# Patient Record
Sex: FEMALE | Race: WHITE | NOT HISPANIC OR LATINO | Employment: PART TIME | URBAN - METROPOLITAN AREA
[De-identification: names, ages, dates, MRNs, and addresses within clinical notes are randomized per-mention and may not be internally consistent; named-entity substitution may affect disease eponyms.]

---

## 2018-03-22 ENCOUNTER — OFFICE VISIT CONVERTED (OUTPATIENT)
Dept: FAMILY MEDICINE CLINIC | Facility: CLINIC | Age: 38
End: 2018-03-22
Attending: PHYSICIAN ASSISTANT

## 2018-03-22 ENCOUNTER — CONVERSION ENCOUNTER (OUTPATIENT)
Dept: FAMILY MEDICINE CLINIC | Facility: CLINIC | Age: 38
End: 2018-03-22

## 2018-06-22 ENCOUNTER — OFFICE VISIT CONVERTED (OUTPATIENT)
Dept: FAMILY MEDICINE CLINIC | Facility: CLINIC | Age: 38
End: 2018-06-22
Attending: PHYSICIAN ASSISTANT

## 2018-06-22 ENCOUNTER — CONVERSION ENCOUNTER (OUTPATIENT)
Dept: FAMILY MEDICINE CLINIC | Facility: CLINIC | Age: 38
End: 2018-06-22

## 2018-11-08 ENCOUNTER — OFFICE VISIT CONVERTED (OUTPATIENT)
Dept: FAMILY MEDICINE CLINIC | Facility: CLINIC | Age: 38
End: 2018-11-08
Attending: PHYSICIAN ASSISTANT

## 2018-12-04 ENCOUNTER — OFFICE VISIT CONVERTED (OUTPATIENT)
Dept: FAMILY MEDICINE CLINIC | Facility: CLINIC | Age: 38
End: 2018-12-04
Attending: PHYSICIAN ASSISTANT

## 2019-02-21 ENCOUNTER — OFFICE VISIT CONVERTED (OUTPATIENT)
Dept: FAMILY MEDICINE CLINIC | Facility: CLINIC | Age: 39
End: 2019-02-21
Attending: PHYSICIAN ASSISTANT

## 2019-02-22 ENCOUNTER — HOSPITAL ENCOUNTER (OUTPATIENT)
Dept: LAB | Facility: HOSPITAL | Age: 39
Discharge: HOME | End: 2019-02-22
Attending: PHYSICIAN ASSISTANT

## 2019-02-22 LAB
T4 FREE SERPL-MCNC: 1 NG/DL (ref 0.9–1.8)
TSH SERPL-ACNC: 1.44 M[IU]/L (ref 0.27–4.2)

## 2019-03-26 ENCOUNTER — HOSPITAL ENCOUNTER (OUTPATIENT)
Dept: LAB | Facility: HOSPITAL | Age: 39
Discharge: HOME | End: 2019-03-26

## 2019-03-26 LAB
ALBUMIN SERPL-MCNC: 4.7 G/DL (ref 3.5–5)
ALBUMIN/GLOB SERPL: 1.6 {RATIO} (ref 1.4–2.6)
ALP SERPL-CCNC: 68 U/L (ref 42–98)
ALT SERPL-CCNC: 31 U/L (ref 10–40)
ANION GAP SERPL CALC-SCNC: 17 MMOL/L (ref 8–19)
AST SERPL-CCNC: 24 U/L (ref 15–50)
BASOPHILS # BLD AUTO: 0.04 10*3/UL (ref 0–0.2)
BASOPHILS NFR BLD AUTO: 0.6 % (ref 0–3)
BILIRUB SERPL-MCNC: 0.32 MG/DL (ref 0.2–1.3)
BUN SERPL-MCNC: 14 MG/DL (ref 5–25)
BUN/CREAT SERPL: 19 {RATIO} (ref 6–20)
CALCIUM SERPL-MCNC: 9.3 MG/DL (ref 8.7–10.4)
CHLORIDE SERPL-SCNC: 102 MMOL/L (ref 99–111)
CHOLEST SERPL-MCNC: 181 MG/DL (ref 107–200)
CHOLEST/HDLC SERPL: 3.2 {RATIO} (ref 3–6)
CONV ABS IMM GRAN: 0.02 10*3/UL (ref 0–0.2)
CONV CO2: 22 MMOL/L (ref 22–32)
CONV IMMATURE GRAN: 0.3 % (ref 0–1.8)
CONV TOTAL PROTEIN: 7.7 G/DL (ref 6.3–8.2)
CREAT UR-MCNC: 0.73 MG/DL (ref 0.5–0.9)
DEPRECATED RDW RBC AUTO: 49 FL (ref 36.4–46.3)
EOSINOPHIL # BLD AUTO: 0.24 10*3/UL (ref 0–0.7)
EOSINOPHIL # BLD AUTO: 3.3 % (ref 0–7)
ERYTHROCYTE [DISTWIDTH] IN BLOOD BY AUTOMATED COUNT: 14.1 % (ref 11.7–14.4)
GFR SERPLBLD BASED ON 1.73 SQ M-ARVRAT: >60 ML/MIN/{1.73_M2}
GLOBULIN UR ELPH-MCNC: 3 G/DL (ref 2–3.5)
GLUCOSE SERPL-MCNC: 89 MG/DL (ref 65–99)
HBA1C MFR BLD: 15.1 G/DL (ref 12–16)
HCT VFR BLD AUTO: 45.8 % (ref 37–47)
HDLC SERPL-MCNC: 56 MG/DL (ref 40–60)
LDLC SERPL CALC-MCNC: 114 MG/DL (ref 70–100)
LYMPHOCYTES # BLD AUTO: 1.9 10*3/UL (ref 1–5)
MCH RBC QN AUTO: 31 PG (ref 27–31)
MCHC RBC AUTO-ENTMCNC: 33 G/DL (ref 33–37)
MCV RBC AUTO: 94 FL (ref 81–99)
MONOCYTES # BLD AUTO: 0.56 10*3/UL (ref 0.2–1.2)
MONOCYTES NFR BLD AUTO: 7.7 % (ref 3–10)
NEUTROPHILS # BLD AUTO: 4.47 10*3/UL (ref 2–8)
NEUTROPHILS NFR BLD AUTO: 61.8 % (ref 30–85)
NRBC CBCN: 0 % (ref 0–0.7)
OSMOLALITY SERPL CALC.SUM OF ELEC: 284 MOSM/KG (ref 273–304)
PLATELET # BLD AUTO: 323 10*3/UL (ref 130–400)
PMV BLD AUTO: 9.8 FL (ref 9.4–12.3)
POTASSIUM SERPL-SCNC: 4.4 MMOL/L (ref 3.5–5.3)
RBC # BLD AUTO: 4.87 10*6/UL (ref 4.2–5.4)
SODIUM SERPL-SCNC: 137 MMOL/L (ref 135–147)
TRIGL SERPL-MCNC: 55 MG/DL (ref 40–150)
TSH SERPL-ACNC: 2.34 M[IU]/L (ref 0.27–4.2)
VARIANT LYMPHS NFR BLD MANUAL: 26.3 % (ref 20–45)
VLDLC SERPL-MCNC: 11 MG/DL (ref 5–37)
WBC # BLD AUTO: 7.23 10*3/UL (ref 4.8–10.8)

## 2019-05-21 ENCOUNTER — OFFICE VISIT CONVERTED (OUTPATIENT)
Dept: FAMILY MEDICINE CLINIC | Facility: CLINIC | Age: 39
End: 2019-05-21
Attending: PHYSICIAN ASSISTANT

## 2019-05-21 ENCOUNTER — CONVERSION ENCOUNTER (OUTPATIENT)
Dept: FAMILY MEDICINE CLINIC | Facility: CLINIC | Age: 39
End: 2019-05-21

## 2019-08-15 ENCOUNTER — HOSPITAL ENCOUNTER (OUTPATIENT)
Dept: LAB | Facility: HOSPITAL | Age: 39
Discharge: HOME | End: 2019-08-15
Attending: PHYSICIAN ASSISTANT

## 2019-08-15 LAB
T4 FREE SERPL-MCNC: 1 NG/DL (ref 0.9–1.8)
TSH SERPL-ACNC: 0.97 M[IU]/L (ref 0.27–4.2)

## 2019-08-23 ENCOUNTER — OFFICE VISIT CONVERTED (OUTPATIENT)
Dept: FAMILY MEDICINE CLINIC | Facility: CLINIC | Age: 39
End: 2019-08-23
Attending: PHYSICIAN ASSISTANT

## 2019-08-23 ENCOUNTER — CONVERSION ENCOUNTER (OUTPATIENT)
Dept: FAMILY MEDICINE CLINIC | Facility: CLINIC | Age: 39
End: 2019-08-23

## 2019-12-05 ENCOUNTER — CONVERSION ENCOUNTER (OUTPATIENT)
Dept: FAMILY MEDICINE CLINIC | Facility: CLINIC | Age: 39
End: 2019-12-05

## 2019-12-05 ENCOUNTER — OFFICE VISIT CONVERTED (OUTPATIENT)
Dept: FAMILY MEDICINE CLINIC | Facility: CLINIC | Age: 39
End: 2019-12-05
Attending: PHYSICIAN ASSISTANT

## 2020-02-17 ENCOUNTER — HOSPITAL ENCOUNTER (OUTPATIENT)
Dept: OTHER | Facility: HOSPITAL | Age: 40
Discharge: HOME | End: 2020-02-17
Attending: INTERNAL MEDICINE

## 2020-03-03 ENCOUNTER — HOSPITAL ENCOUNTER (OUTPATIENT)
Dept: LAB | Facility: HOSPITAL | Age: 40
Discharge: HOME | End: 2020-03-03

## 2020-03-03 LAB
ALBUMIN SERPL-MCNC: 4.1 G/DL (ref 3.5–5)
ALBUMIN/GLOB SERPL: 1.7 {RATIO} (ref 1.4–2.6)
ALP SERPL-CCNC: 59 U/L (ref 42–98)
ALT SERPL-CCNC: 18 U/L (ref 10–40)
ANION GAP SERPL CALC-SCNC: 16 MMOL/L (ref 8–19)
AST SERPL-CCNC: 18 U/L (ref 15–50)
BASOPHILS # BLD AUTO: 0.04 10*3/UL (ref 0–0.2)
BASOPHILS NFR BLD AUTO: 0.6 % (ref 0–3)
BILIRUB SERPL-MCNC: 0.23 MG/DL (ref 0.2–1.3)
BUN SERPL-MCNC: 10 MG/DL (ref 5–25)
BUN/CREAT SERPL: 16 {RATIO} (ref 6–20)
CALCIUM SERPL-MCNC: 8.5 MG/DL (ref 8.7–10.4)
CHLORIDE SERPL-SCNC: 101 MMOL/L (ref 99–111)
CHOLEST SERPL-MCNC: 139 MG/DL (ref 107–200)
CHOLEST/HDLC SERPL: 2.2 {RATIO} (ref 3–6)
CONV ABS IMM GRAN: 0.01 10*3/UL (ref 0–0.2)
CONV CO2: 24 MMOL/L (ref 22–32)
CONV IMMATURE GRAN: 0.1 % (ref 0–1.8)
CONV TOTAL PROTEIN: 6.5 G/DL (ref 6.3–8.2)
CREAT UR-MCNC: 0.64 MG/DL (ref 0.5–0.9)
DEPRECATED RDW RBC AUTO: 47.9 FL (ref 36.4–46.3)
EOSINOPHIL # BLD AUTO: 0.2 10*3/UL (ref 0–0.7)
EOSINOPHIL # BLD AUTO: 2.9 % (ref 0–7)
ERYTHROCYTE [DISTWIDTH] IN BLOOD BY AUTOMATED COUNT: 13.4 % (ref 11.7–14.4)
GFR SERPLBLD BASED ON 1.73 SQ M-ARVRAT: >60 ML/MIN/{1.73_M2}
GLOBULIN UR ELPH-MCNC: 2.4 G/DL (ref 2–3.5)
GLUCOSE SERPL-MCNC: 91 MG/DL (ref 65–99)
HCT VFR BLD AUTO: 39.4 % (ref 37–47)
HDLC SERPL-MCNC: 62 MG/DL (ref 40–60)
HGB BLD-MCNC: 12.8 G/DL (ref 12–16)
LDLC SERPL CALC-MCNC: 69 MG/DL (ref 70–100)
LYMPHOCYTES # BLD AUTO: 2.27 10*3/UL (ref 1–5)
LYMPHOCYTES NFR BLD AUTO: 32.7 % (ref 20–45)
MCH RBC QN AUTO: 31.4 PG (ref 27–31)
MCHC RBC AUTO-ENTMCNC: 32.5 G/DL (ref 33–37)
MCV RBC AUTO: 96.8 FL (ref 81–99)
MONOCYTES # BLD AUTO: 0.67 10*3/UL (ref 0.2–1.2)
MONOCYTES NFR BLD AUTO: 9.6 % (ref 3–10)
NEUTROPHILS # BLD AUTO: 3.76 10*3/UL (ref 2–8)
NEUTROPHILS NFR BLD AUTO: 54.1 % (ref 30–85)
NRBC CBCN: 0 % (ref 0–0.7)
OSMOLALITY SERPL CALC.SUM OF ELEC: 283 MOSM/KG (ref 273–304)
PLATELET # BLD AUTO: 281 10*3/UL (ref 130–400)
PMV BLD AUTO: 9.4 FL (ref 9.4–12.3)
POTASSIUM SERPL-SCNC: 3.8 MMOL/L (ref 3.5–5.3)
RBC # BLD AUTO: 4.07 10*6/UL (ref 4.2–5.4)
SODIUM SERPL-SCNC: 137 MMOL/L (ref 135–147)
TRIGL SERPL-MCNC: 40 MG/DL (ref 40–150)
TSH SERPL-ACNC: 2.08 M[IU]/L (ref 0.27–4.2)
VLDLC SERPL-MCNC: 8 MG/DL (ref 5–37)
WBC # BLD AUTO: 6.95 10*3/UL (ref 4.8–10.8)

## 2020-03-05 ENCOUNTER — OFFICE VISIT CONVERTED (OUTPATIENT)
Dept: FAMILY MEDICINE CLINIC | Facility: CLINIC | Age: 40
End: 2020-03-05
Attending: PHYSICIAN ASSISTANT

## 2020-03-05 ENCOUNTER — HOSPITAL ENCOUNTER (OUTPATIENT)
Dept: LAB | Facility: HOSPITAL | Age: 40
Discharge: HOME | End: 2020-03-05
Attending: PHYSICIAN ASSISTANT

## 2020-03-08 LAB
CONV ESTROGENS, TOTAL, SERUM: 390 PG/ML
FSH SERPL-ACNC: 5.9 M[IU]/ML
LH SERPL-ACNC: 12.9 M[IU]/ML
PROGEST SERPL-MCNC: 0.7 NG/ML

## 2020-03-09 LAB
CONV TESTOSTERONE, FREE: 2.6 PG/ML
TESTOST FREE MFR SERPL: 0.8 %
TESTOST SERPL-MCNC: 32 NG/DL

## 2020-03-13 ENCOUNTER — HOSPITAL ENCOUNTER (OUTPATIENT)
Dept: GENERAL RADIOLOGY | Facility: HOSPITAL | Age: 40
Discharge: HOME | End: 2020-03-13
Attending: PHYSICIAN ASSISTANT

## 2020-03-13 ENCOUNTER — HOSPITAL ENCOUNTER (OUTPATIENT)
Dept: CARDIOLOGY | Facility: HOSPITAL | Age: 40
Discharge: HOME | End: 2020-03-13
Attending: PHYSICIAN ASSISTANT

## 2020-03-23 ENCOUNTER — HOSPITAL ENCOUNTER (OUTPATIENT)
Dept: LAB | Facility: HOSPITAL | Age: 40
Discharge: HOME | End: 2020-03-23
Attending: PHYSICIAN ASSISTANT

## 2020-03-23 ENCOUNTER — HOSPITAL ENCOUNTER (OUTPATIENT)
Dept: GENERAL RADIOLOGY | Facility: HOSPITAL | Age: 40
Discharge: HOME | End: 2020-03-23
Attending: PHYSICIAN ASSISTANT

## 2020-03-23 LAB
MAGNESIUM SERPL-MCNC: 1.88 MG/DL (ref 1.6–2.3)
PHOSPHATE SERPL-MCNC: 3.3 MG/DL (ref 2.4–4.5)
PTH-INTACT SERPL-MCNC: 33.2 PG/ML (ref 11.1–79.5)

## 2020-03-24 ENCOUNTER — HOSPITAL ENCOUNTER (OUTPATIENT)
Dept: CARDIOLOGY | Facility: HOSPITAL | Age: 40
Discharge: HOME | End: 2020-03-24
Attending: PHYSICIAN ASSISTANT

## 2020-03-24 LAB — CONV CALCIUM IONIZED: 5 MG/DL (ref 4.5–5.6)

## 2020-06-05 ENCOUNTER — OFFICE VISIT CONVERTED (OUTPATIENT)
Dept: FAMILY MEDICINE CLINIC | Facility: CLINIC | Age: 40
End: 2020-06-05
Attending: PHYSICIAN ASSISTANT

## 2020-07-15 ENCOUNTER — OFFICE VISIT CONVERTED (OUTPATIENT)
Dept: CARDIOLOGY | Facility: CLINIC | Age: 40
End: 2020-07-15
Attending: INTERNAL MEDICINE

## 2020-07-15 ENCOUNTER — CONVERSION ENCOUNTER (OUTPATIENT)
Dept: CARDIOLOGY | Facility: CLINIC | Age: 40
End: 2020-07-15

## 2020-07-22 ENCOUNTER — HOSPITAL ENCOUNTER (OUTPATIENT)
Dept: GENERAL RADIOLOGY | Facility: HOSPITAL | Age: 40
Discharge: HOME | End: 2020-07-22
Attending: PHYSICIAN ASSISTANT

## 2020-07-23 ENCOUNTER — HOSPITAL ENCOUNTER (OUTPATIENT)
Dept: URGENT CARE | Facility: CLINIC | Age: 40
Discharge: HOME | End: 2020-07-23
Attending: NURSE PRACTITIONER

## 2020-07-24 ENCOUNTER — HOSPITAL ENCOUNTER (OUTPATIENT)
Dept: URGENT CARE | Facility: CLINIC | Age: 40
Discharge: HOME | End: 2020-07-24
Attending: NURSE PRACTITIONER

## 2020-07-24 LAB — SARS-COV-2 RNA SPEC QL NAA+PROBE: NOT DETECTED

## 2020-07-25 LAB — SARS-COV-2 RNA SPEC QL NAA+PROBE: NOT DETECTED

## 2020-08-19 ENCOUNTER — HOSPITAL ENCOUNTER (OUTPATIENT)
Dept: GENERAL RADIOLOGY | Facility: HOSPITAL | Age: 40
Discharge: HOME | End: 2020-08-19
Attending: PHYSICIAN ASSISTANT

## 2020-09-08 ENCOUNTER — OFFICE VISIT CONVERTED (OUTPATIENT)
Dept: FAMILY MEDICINE CLINIC | Facility: CLINIC | Age: 40
End: 2020-09-08
Attending: PHYSICIAN ASSISTANT

## 2020-09-08 ENCOUNTER — CONVERSION ENCOUNTER (OUTPATIENT)
Dept: FAMILY MEDICINE CLINIC | Facility: CLINIC | Age: 40
End: 2020-09-08

## 2020-12-11 ENCOUNTER — HOSPITAL ENCOUNTER (OUTPATIENT)
Dept: LAB | Facility: HOSPITAL | Age: 40
Discharge: HOME | End: 2020-12-11
Attending: PHYSICIAN ASSISTANT

## 2020-12-11 ENCOUNTER — OFFICE VISIT CONVERTED (OUTPATIENT)
Dept: FAMILY MEDICINE CLINIC | Facility: CLINIC | Age: 40
End: 2020-12-11
Attending: PHYSICIAN ASSISTANT

## 2020-12-11 LAB
25(OH)D3 SERPL-MCNC: 27 NG/ML (ref 30–100)
ALBUMIN SERPL-MCNC: 4.2 G/DL (ref 3.5–5)
ALBUMIN/GLOB SERPL: 1.6 {RATIO} (ref 1.4–2.6)
ALP SERPL-CCNC: 70 U/L (ref 42–98)
ALT SERPL-CCNC: 38 U/L (ref 10–40)
ANION GAP SERPL CALC-SCNC: 17 MMOL/L (ref 8–19)
APPEARANCE UR: CLEAR
AST SERPL-CCNC: 29 U/L (ref 15–50)
BASOPHILS # BLD AUTO: 0.04 10*3/UL (ref 0–0.2)
BASOPHILS NFR BLD AUTO: 0.5 % (ref 0–3)
BILIRUB SERPL-MCNC: 0.2 MG/DL (ref 0.2–1.3)
BILIRUB UR QL: NEGATIVE
BUN SERPL-MCNC: 19 MG/DL (ref 5–25)
BUN/CREAT SERPL: 27 {RATIO} (ref 6–20)
CALCIUM SERPL-MCNC: 8.9 MG/DL (ref 8.7–10.4)
CHLORIDE SERPL-SCNC: 104 MMOL/L (ref 99–111)
CHOLEST SERPL-MCNC: 190 MG/DL (ref 107–200)
CHOLEST/HDLC SERPL: 3 {RATIO} (ref 3–6)
COLOR UR: YELLOW
CONV ABS IMM GRAN: 0.01 10*3/UL (ref 0–0.2)
CONV CO2: 20 MMOL/L (ref 22–32)
CONV COLLECTION SOURCE (UA): NORMAL
CONV IMMATURE GRAN: 0.1 % (ref 0–1.8)
CONV TOTAL PROTEIN: 6.9 G/DL (ref 6.3–8.2)
CONV UROBILINOGEN IN URINE BY AUTOMATED TEST STRIP: 0.2 {EHRLICHU}/DL (ref 0.1–1)
CREAT UR-MCNC: 0.7 MG/DL (ref 0.5–0.9)
DEPRECATED RDW RBC AUTO: 44.8 FL (ref 36.4–46.3)
EOSINOPHIL # BLD AUTO: 0.25 10*3/UL (ref 0–0.7)
EOSINOPHIL # BLD AUTO: 3.4 % (ref 0–7)
ERYTHROCYTE [DISTWIDTH] IN BLOOD BY AUTOMATED COUNT: 13 % (ref 11.7–14.4)
GFR SERPLBLD BASED ON 1.73 SQ M-ARVRAT: >60 ML/MIN/{1.73_M2}
GLOBULIN UR ELPH-MCNC: 2.7 G/DL (ref 2–3.5)
GLUCOSE SERPL-MCNC: 87 MG/DL (ref 65–99)
GLUCOSE UR QL: NEGATIVE MG/DL
HCT VFR BLD AUTO: 41.4 % (ref 37–47)
HDLC SERPL-MCNC: 64 MG/DL (ref 40–60)
HGB BLD-MCNC: 13.6 G/DL (ref 12–16)
HGB UR QL STRIP: NEGATIVE
KETONES UR QL STRIP: NEGATIVE MG/DL
LDLC SERPL CALC-MCNC: 112 MG/DL (ref 70–100)
LEUKOCYTE ESTERASE UR QL STRIP: NEGATIVE
LYMPHOCYTES # BLD AUTO: 1.92 10*3/UL (ref 1–5)
LYMPHOCYTES NFR BLD AUTO: 26.3 % (ref 20–45)
MCH RBC QN AUTO: 30.7 PG (ref 27–31)
MCHC RBC AUTO-ENTMCNC: 32.9 G/DL (ref 33–37)
MCV RBC AUTO: 93.5 FL (ref 81–99)
MONOCYTES # BLD AUTO: 0.73 10*3/UL (ref 0.2–1.2)
MONOCYTES NFR BLD AUTO: 10 % (ref 3–10)
NEUTROPHILS # BLD AUTO: 4.34 10*3/UL (ref 2–8)
NEUTROPHILS NFR BLD AUTO: 59.7 % (ref 30–85)
NITRITE UR QL STRIP: NEGATIVE
NRBC CBCN: 0 % (ref 0–0.7)
OSMOLALITY SERPL CALC.SUM OF ELEC: 286 MOSM/KG (ref 273–304)
PH UR STRIP.AUTO: 5.5 [PH] (ref 5–8)
PLATELET # BLD AUTO: 276 10*3/UL (ref 130–400)
PMV BLD AUTO: 10 FL (ref 9.4–12.3)
POTASSIUM SERPL-SCNC: 3.9 MMOL/L (ref 3.5–5.3)
PROT UR QL: NEGATIVE MG/DL
RBC # BLD AUTO: 4.43 10*6/UL (ref 4.2–5.4)
SODIUM SERPL-SCNC: 137 MMOL/L (ref 135–147)
SP GR UR: 1.02 (ref 1–1.03)
T4 FREE SERPL-MCNC: 0.9 NG/DL (ref 0.9–1.8)
TRIGL SERPL-MCNC: 68 MG/DL (ref 40–150)
TSH SERPL-ACNC: 2.4 M[IU]/L (ref 0.27–4.2)
VLDLC SERPL-MCNC: 14 MG/DL (ref 5–37)
WBC # BLD AUTO: 7.29 10*3/UL (ref 4.8–10.8)

## 2021-01-16 ENCOUNTER — HOSPITAL ENCOUNTER (OUTPATIENT)
Dept: URGENT CARE | Facility: CLINIC | Age: 41
Discharge: HOME | End: 2021-01-16
Attending: NURSE PRACTITIONER

## 2021-01-18 LAB — SARS-COV-2 RNA SPEC QL NAA+PROBE: NOT DETECTED

## 2021-03-12 ENCOUNTER — OFFICE VISIT CONVERTED (OUTPATIENT)
Dept: FAMILY MEDICINE CLINIC | Facility: CLINIC | Age: 41
End: 2021-03-12
Attending: PHYSICIAN ASSISTANT

## 2021-03-12 ENCOUNTER — CONVERSION ENCOUNTER (OUTPATIENT)
Dept: FAMILY MEDICINE CLINIC | Facility: CLINIC | Age: 41
End: 2021-03-12

## 2021-03-30 ENCOUNTER — HOSPITAL ENCOUNTER (OUTPATIENT)
Dept: LAB | Facility: HOSPITAL | Age: 41
Discharge: HOME | End: 2021-03-30

## 2021-03-30 LAB
ALBUMIN SERPL-MCNC: 4.2 G/DL (ref 3.5–5)
ALBUMIN/GLOB SERPL: 1.4 {RATIO} (ref 1.4–2.6)
ALP SERPL-CCNC: 65 U/L (ref 42–98)
ALT SERPL-CCNC: 13 U/L (ref 10–40)
ANION GAP SERPL CALC-SCNC: 14 MMOL/L (ref 8–19)
AST SERPL-CCNC: 16 U/L (ref 15–50)
BASOPHILS # BLD AUTO: 0.03 10*3/UL (ref 0–0.2)
BASOPHILS NFR BLD AUTO: 0.5 % (ref 0–3)
BILIRUB SERPL-MCNC: 0.25 MG/DL (ref 0.2–1.3)
BUN SERPL-MCNC: 9 MG/DL (ref 5–25)
BUN/CREAT SERPL: 12 {RATIO} (ref 6–20)
CALCIUM SERPL-MCNC: 9.1 MG/DL (ref 8.7–10.4)
CHLORIDE SERPL-SCNC: 103 MMOL/L (ref 99–111)
CHOLEST SERPL-MCNC: 172 MG/DL (ref 107–200)
CHOLEST/HDLC SERPL: 3.2 {RATIO} (ref 3–6)
CONV ABS IMM GRAN: 0.01 10*3/UL (ref 0–0.2)
CONV CO2: 21 MMOL/L (ref 22–32)
CONV IMMATURE GRAN: 0.2 % (ref 0–1.8)
CONV TOTAL PROTEIN: 7.2 G/DL (ref 6.3–8.2)
CREAT UR-MCNC: 0.76 MG/DL (ref 0.5–0.9)
DEPRECATED RDW RBC AUTO: 46.8 FL (ref 36.4–46.3)
EOSINOPHIL # BLD AUTO: 0.14 10*3/UL (ref 0–0.7)
EOSINOPHIL # BLD AUTO: 2.2 % (ref 0–7)
ERYTHROCYTE [DISTWIDTH] IN BLOOD BY AUTOMATED COUNT: 13.7 % (ref 11.7–14.4)
GFR SERPLBLD BASED ON 1.73 SQ M-ARVRAT: >60 ML/MIN/{1.73_M2}
GLOBULIN UR ELPH-MCNC: 3 G/DL (ref 2–3.5)
GLUCOSE SERPL-MCNC: 77 MG/DL (ref 65–99)
HCT VFR BLD AUTO: 42.3 % (ref 37–47)
HDLC SERPL-MCNC: 53 MG/DL (ref 40–60)
HGB BLD-MCNC: 14 G/DL (ref 12–16)
LDLC SERPL CALC-MCNC: 103 MG/DL (ref 70–100)
LYMPHOCYTES # BLD AUTO: 1.97 10*3/UL (ref 1–5)
LYMPHOCYTES NFR BLD AUTO: 30.6 % (ref 20–45)
MCH RBC QN AUTO: 30.6 PG (ref 27–31)
MCHC RBC AUTO-ENTMCNC: 33.1 G/DL (ref 33–37)
MCV RBC AUTO: 92.6 FL (ref 81–99)
MONOCYTES # BLD AUTO: 0.53 10*3/UL (ref 0.2–1.2)
MONOCYTES NFR BLD AUTO: 8.2 % (ref 3–10)
NEUTROPHILS # BLD AUTO: 3.75 10*3/UL (ref 2–8)
NEUTROPHILS NFR BLD AUTO: 58.3 % (ref 30–85)
NRBC CBCN: 0 % (ref 0–0.7)
OSMOLALITY SERPL CALC.SUM OF ELEC: 275 MOSM/KG (ref 273–304)
PLATELET # BLD AUTO: 315 10*3/UL (ref 130–400)
PMV BLD AUTO: 9.7 FL (ref 9.4–12.3)
POTASSIUM SERPL-SCNC: 4.3 MMOL/L (ref 3.5–5.3)
RBC # BLD AUTO: 4.57 10*6/UL (ref 4.2–5.4)
SODIUM SERPL-SCNC: 134 MMOL/L (ref 135–147)
TRIGL SERPL-MCNC: 79 MG/DL (ref 40–150)
TSH SERPL-ACNC: 1.62 M[IU]/L (ref 0.27–4.2)
VLDLC SERPL-MCNC: 16 MG/DL (ref 5–37)
WBC # BLD AUTO: 6.43 10*3/UL (ref 4.8–10.8)

## 2021-04-02 ENCOUNTER — HOSPITAL ENCOUNTER (OUTPATIENT)
Dept: VACCINE CLINIC | Facility: HOSPITAL | Age: 41
Discharge: HOME | End: 2021-04-02
Attending: INTERNAL MEDICINE

## 2021-04-27 ENCOUNTER — HOSPITAL ENCOUNTER (OUTPATIENT)
Dept: VACCINE CLINIC | Facility: HOSPITAL | Age: 41
Discharge: HOME | End: 2021-04-27
Attending: INTERNAL MEDICINE

## 2021-05-10 NOTE — H&P
History and Physical      Patient Name: Audra Daniel   Patient ID: [id number]   Sex: Female   YOB: 1980    Primary Care Provider: Solomon Coleman PA-C   Referring Provider: Solomon Coleman PA-C    Visit Date: July 15, 2020    Provider: Ayo Cunningham MD   Location: [facility]   Location Address: [address] [place] [address], [facility]   [place], [place]  [id number]   Location Phone: [phone]          Chief Complaint  · Tachycardia   · Shortness of breath       History Of Present Illness  Consult requested by: Solomon Coleman PA-C   Audra Daniel is a 40 year old /White female with hypothyroidism and anxiety, who had an episode of tachycardia that occurred at work initially about 6 months ago. It was associated with shortness of breath. Rate was in the 120s-130s. The patient has had subsequent episodes since then, all of which appear to have no provoking factors. She was started on Metoprolol with improvement. She has had no syncope, no chest pain, no PND or orthopnea.   PAST MEDICAL HISTORY: Significant for hypothyroidism; PCOS; MTF HR; anxiety.   FAMILY MEDICAL HISTORY: Significant for her mom who had atrial flutter and hypertension.   PSYCHOSOCIAL HISTORY: She is a prior smoker, uses alcohol rarely and caffeine daily but has cut back significantly in the last month and a half or so.   CURRENT MEDICATIONS: include Synthroid 85 mcg daily; Pantoprazole 40 mg daily; Metoprolol 25 mg daily; Metformin 500 mg b.i.d.; Spironolactone 100 mg daily; Fluoxetine 40 mg daily; Bupropion 150 mg b.i.d; Vyvanse 60 mg daily; Topamax 50 mg b.i.d.; Zolpidem 10 mg qhs; Vitamin B; ASA 81 mg daily. The dosage and frequency of the medications were reviewed with the patient.   ALLERGIES: Codeine, Macrobid.       Review of Systems  · Constitutional  o Admits  o : fatigue, good general health lately, recent weight changes   · Eyes  o Denies  o : double vision  · HENT  o Denies  o : hearing loss or ringing,  "chronic sinus problem, swollen glands in neck  · Cardiovascular  o Admits  o : palpitations (fast, fluttering, or skipping beats), shortness of breath while walking or lying flat  o Denies  o : chest pain, swelling (feet, ankles, hands)  · Respiratory  o Denies  o : asthma or wheezing, COPD  · Gastrointestinal  o Denies  o : ulcers, nausea or vomiting  · Neurologic  o Admits  o : headaches  o Denies  o : lightheaded or dizzy, stroke  · Musculoskeletal  o Denies  o : joint pain, back pain  · Endocrine  o Admits  o : thyroid disease  o Denies  o : diabetes, heat or cold intolerance, excessive thirst or urination  · Heme-Lymph  o Denies  o : bleeding or bruising tendency, anemia      Vitals  Date Time BP Position Site L\R Cuff Size HR RR TEMP (F) WT  HT  BMI kg/m2 BSA m2 O2 Sat HC       07/15/2020 01:03 /68 Sitting    78 - R   236lbs 0oz 5'  4\" 40.51 2.2           Physical Examination  · Constitutional  o Appearance  o : Awake, alert, in no acute distress.   · Head and Face  o HEENT  o : PERRLA.  · Eyes  o Conjunctivae  o : Normal.  · Ears, Nose, Mouth and Throat  o Oral Cavity  o :   § Oral Mucosa  § : Normal.  · Neck  o Inspection/Palpation  o : No JVD.  · Respiratory  o Respiratory  o : Chest is symmetrical. Lung fields are clear. No rhonchi or wheezes heard.  · Cardiovascular  o Heart  o :   § Auscultation of Heart  § : S1, S2 are normal. Regular rate and rhythm without murmurs, gallops, or rubs.  o Peripheral Vascular System  o :   § Extremities  § : Nails normal. No clubbing or cyanosis. Femoral pulses adequate. Pedal pulses adequate. No peripheral edema.  · Gastrointestinal  o Abdominal Examination  o : Abdomen soft. No masses. No guarding or rigidity. No hepatosplenomegaly. Bowel sounds normal.  · Musculoskeletal  o General  o :   § General Musculoskeletal  § : Muscle tone and strength were normal.  · Skin and Subcutaneous Tissue  o General Inspection  o : Unremarkable.     Patient's echocardiogram " revealed a normal ejection fraction of 60%.  No significant underlying valvular heart issues.    EKG was performed in the office today.  Indication:       tachycardia.  Results:          normal sinus rhythm and borderline low voltage.    Hemoglobin was 12.8.  TSH is 2.08.      24-hour Holter Monitor showed normal sinus rhythm, average heart rate of 86 beats per minute and sinus tachycardia, occasional rare PACs and PVCs.               Assessment     ASSESSMENT AND PLAN:    Tachycardia - Feel most likely inappropriate sinus tachycardia, possibly triggered by anxiety.  Also, the  differential would be ectopic atrial tachycardia, although not clearly seen on the Holter Monitor.  Did discuss      with patient that either of these two cases are benign in nature.  Also would recommend eliminating all     caffeinated products as well as an exercise program.  The patient appears to have done well symptomatically with Toprol, so will continue this for the time being.  If patient remains stable, may be able to go to an as-needed basis in the future.    MD Sergo Quiroz/[place]ana         This note was transcribed by Jo Love.  montana/sergo   The above service was transcribed by Jo Love, and I attest to the accuracy of the note.  SERGO.                   Electronically Signed by: Jo Love-, -Author on July 20, 2020 09:58:06 AM  Electronically Co-signed by: Ayo Cunningham MD -Reviewer on July 27, 2020 10:59:23 AM

## 2021-05-13 NOTE — PROGRESS NOTES
Progress Note      Patient Name: Audra Daniel   Patient ID: [id number]   Sex: Female   YOB: 1980    Primary Care Provider: Solomon Coleman PA-C   Referring Provider: Solomon Coleman PA-C    Visit Date: June 5, 2020    Provider: Solomon Coleman PA-C   Location: [facility]   Location Address: [address] [place] [address], [address]  [place], [place]  [id number]   Location Phone: [phone]          Chief Complaint  · 3 month follow up  · headaches      History Of Present Illness  Audra Daniel is a 40 year old /White female who presents for evaluation and treatment of: 3 month follow up and still having headaches.      pt presents today for 3 month follow up.    pt stated she is still having headaches, at least 5 out of 7 days.    July 15th - Dr. Cunningham - Cardio    Pt has cyst on ovary - on U/S    Metoprolol - has helped with pt    Pt is having daily terrible HAs - behind her eyes.  She is taking lots of IBU.  Patient had recent imaging which looked normal.    Patient denies any nausea vomiting photophobia             Past Medical History  Disease Name Date Onset Notes   ADD (attention deficit disorder) 10/31/2017 --    Anxiety --  --    Anxiety disorder 11/17/2016 --    Breast implant rupture 2016 --    Depression --  --    Hypothyroidism 08/15/2019 --    Insomnia 07/14/2015 --    Insomnia, unspecified 02/17/2017 --    Major depressive disorder 11/17/2016 --    MTHFR mutation 06/28/2016 --    Obesity 11/17/2016 --    Pap smear for cervical cancer screening 07/2019 Dr. Mo-GYN   Pedal edema 03/10/2016 --          Past Surgical History  Procedure Name Date Notes   Breast augmentation 2006 --    Breast lift 2005 --    Carpal tunnel release --  --    D & C 2011 --    trigger finger release --  thumb   [place] Tooth Extraction 2002 --          Medication List  Name Date Started Instructions   Ambien 10 mg oral tablet 06/05/2020 take 1 tablet (10 mg) by oral route once daily at bedtime for 90 days    aspirin 81 mg oral tablet,chewable 10/20/2017 chew 1 tablet (81 mg) by oral route once daily   Ativan 0.5 mg oral tablet 2020 take 1 tablet (0.5 mg) by oral route 2 times per day as needed for 30 days   B Complex oral  --    bupropion HCl 150 mg oral tablet sustained-release 12 hr 2019 TAKE 1 TABLET BY MOUTH TWICE A DAY   fluoxetine 40 mg oral capsule 2019 take 1 capsule (40 mg) by oral route once daily for 90 days   levothyroxine 75 mcg oral tablet 2020 TAKE 1 TABLET BY MOUTH EVERY DAY   liothyronine 5 mcg oral tablet 2019 TAKE 1/2 TO 1 TABLET BY ORAL ROUTE 2 TIMES A DAY   metformin 500 mg oral tablet 2019 TAKE ONE TABLET BY MOUTH TWICE A DAY WITH MORNING AND EVENING MEALS   Protonix 40 mg oral tablet,delayed release (DR/EC) 2019 take 1 tablet (40 mg) by oral route once daily for 90 days   spironolactone 100 mg oral tablet 2019 take 1 tablet (100 mg) by oral route once daily for 90 days   Toprol XL 25 mg oral tablet extended release 24 hr 2020 take 1 tablet (25 mg) by oral route once daily for 30 days   Vyvanse 60 mg oral capsule 2020 take 1 capsule (60 mg) by oral route once daily in the morning         Allergy List  Allergen Name Date Reaction Notes   Codeine Sulfate --  --  --          Family Medical History  Disease Name Relative/Age Notes   Liver Neoplasm, Malignant Uncle/   --    Heart Disease  grandparent   Diabetes, unspecified type  grandparents         Reproductive History  Menstrual   Age Menarche: 9   Pregnancy Summary   Total Pregnancies: 3 Full Term: 2 Premature: 0   Ab Induced: 0 Ab Spontaneous: 0 Ectopics: 0   Multiples: 0 Livin         Social History  Finding Status Start/Stop Quantity Notes   Active but no formal exercise --  --/-- --  --    Alcohol Never --/-- --  2018 - 2018 -     --  --/-- --  --    No known infection risk --  --/-- --  --    Tobacco Former  1/2 pk/day quit in .  "        Immunizations  NameDate Admin Mfg Trade Name Lot Number Route Inj VIS Given VIS Publication   Hepatitis A08/23/2019 SKB HAVRIX-ADULT [id number] IM LD 08/23/2019    Comments: pt tolerated well   Hepatitis A12/07/2018 SKB HAVRIX-ADULT [id number] IM RA 12/07/2018 07/20/2016   Comments: Patient tolerated well.   Kupfudvso31/08/2018 PMC Fluzone Quadrivalent [id number] IM LA 11/08/2018 08/07/2015   Comments: Patient tolerated well.         Review of Systems  · Constitutional  o Admits  o : weight gain  o Denies  o : fever, fatigue, weight loss  · Cardiovascular  o Denies  o : lower extremity edema, claudication, chest pressure, palpitations  · Respiratory  o Denies  o : shortness of breath, wheezing, cough, hemoptysis, dyspnea on exertion  · Gastrointestinal  o Denies  o : nausea, vomiting, diarrhea, constipation, abdominal pain      Vitals  Date Time BP Position Site L\R Cuff Size HR RR TEMP (F) WT  HT  BMI kg/m2 BSA m2 O2 Sat        06/05/2020 08:36 /75 Sitting    81 - R   238lbs 0oz 5'  4\" 40.85 2.21 99 %          Physical Examination  · Constitutional  o Appearance  o : overweight, well developed  · Head and Face  o Head  o : normocephalic, atraumatic  · Ears, Nose, Mouth and Throat  o Ears  o :   § External Ears  § : external auditory canal appearance normal, no discharge present  § Otoscopic Examination  § : tympanic membranes pearly white/gray bilaterally  o Nose  o :   § External Nose  § : no lesions noted  § Nasopharynx  § : no discharge present  o Oral Cavity  o :   § Oral Mucosa  § : oral mucosa light pink  o Throat  o :   § Oropharynx  § : tonsils without exudate, no palatal petechiae  · Neck  o Inspection/Palpation  o : normal appearance, no masses or tenderness, trachea midline  o Thyroid  o : gland size normal, nontender, no nodules or masses present on palpation  · Respiratory  o Respiratory Effort  o : breathing unlabored  o Inspection of Chest  o : chest rise symmetric bilaterally  o Auscultation of " Lungs  o : clear to auscultation bilaterally throughout inspiration and expiration  · Cardiovascular  o Heart  o :   § Auscultation of Heart  § : regular rate and rhythm, no murmurs, gallops or rubs  o Peripheral Vascular System  o :   § Extremities  § : mild lower extremity edema present, no cyanosis, no distal hair loss, normal capillary refill  · Lymphatic  o Neck  o : no cervical lymphadenopathy, no supraclavicular lymphadenopathy  · Psychiatric  o Mood and Affect  o : mood normal, affect appropriate          Assessment  · Anxiety disorder     300.00/F41.9  · Depression     311/F32.9  · Headache     784.0/R51  · Class 3 severe obesity due to excess calories with serious comorbidity and body mass index (BMI) of 40.0 to 44.9 in adult       Morbid (severe) obesity due to excess calories     278.01/E66.01  Body mass index (BMI) 40.0-44.9, adult     278.01/Z68.41  · Visit for screening mammogram     /      Plan  · Orders  o Screening Mammography; Bilateral 3D (15869, 52694, ) - /Z12.31 - 2020  o ACO-39: Current medications updated and reviewed () - - 2020  o ACO-14: Influenza immunization administered or previously received () - - 2020  · Medications  o Topamax 25 mg oral tablet   SItab PO [id number]; then 1tab PO [id number]; then 1 tab QAM and 2 tabs HEIr9mw; then 2 tabs BID.   DISP: (70) tablets with 0 refills  Prescribed on 2020     o Ambien 10 mg oral tablet   SIG: take 1 tablet (10 mg) by oral route once daily at bedtime for 90 days   DISP: (90) tablets with 1 refills  Refilled on 2020     o Ativan 0.5 mg oral tablet   SIG: take 1 tablet (0.5 mg) by oral route 2 times per day as needed for 30 days   DISP: (30) tablets with 0 refills  Refilled on 2020     o Medications have been Reconciled  o Transition of Care or Provider Policy  · Instructions  o Take all medications as prescribed/directed.  o Patient instructed/educated on their diet and  exercise program.  o Patient was educated/instructed on their diagnosis, treatment and medications prior to discharge from the clinic today.  o Patient counseled to reduce calorie intake.  o Patient was instructed to exercise regularly.  o Discussed Covid-19 precautions including, but not limited to, social distancing, avoid touching your face, and hand washing.   · Disposition  o Call or Return if symptoms worsen or persist.  o F/U in 3 months.  o Care Transition            Electronically Signed by: Solomon Coleman PA-C -Author on June 5, 2020 09:41:34 AM

## 2021-05-13 NOTE — PROGRESS NOTES
Progress Note      Patient Name: Audra Daniel   Patient ID: [id number]   Sex: Female   YOB: 1980    Primary Care Provider: Solomon Coleman PA-C   Referring Provider: Solomon Coleman PA-C    Visit Date: December 11, 2020    Provider: Solomon Coleman PA-C   Location: [facility]   Location Address: [address] [place] [address], [address]  [place], [place]  [id number]   Location Phone: [phone]          Chief Complaint  · 3 month follow up  · fatigue  · unable to lose weight      History Of Present Illness  Audra Daniel is a 40 year old /White female who presents for evaluation and treatment of: 3 month follow up.      pt presents today for 3 month follow up.    pt states she has been fatigue more lately and unable to lose weight. pt is currently taking levothyroxine 75mcg.    Labs 3/20  andres 9/20  flu 11/20    Dr. Cunningham - cardio - tachycardia - beta blocker he is thinking it could be anxiety    We had a long discussion regarding her wt and exercise and eating habits.    Pt wants to switch back to adderall from vyvanse       Past Medical History  Disease Name Date Onset Notes   ADD (attention deficit disorder) 10/31/2017 --    Anxiety --  --    Anxiety disorder 11/17/2016 --    Breast implant rupture 2016 --    Depression --  --    Hypothyroidism 08/15/2019 --    Insomnia 07/14/2015 --    Insomnia, unspecified 02/17/2017 --    Major depressive disorder 11/17/2016 --    MTHFR mutation 06/28/2016 --    Obesity 11/17/2016 --    Pap smear for cervical cancer screening 08/2020 Dr. Mo-GYN   Pedal edema 03/10/2016 --          Past Surgical History  Procedure Name Date Notes   Breast augmentation 2006 --    Breast lift 2005 --    Carpal tunnel release --  --    D & C 2011 --    trigger finger release --  thumb   [place] Tooth Extraction 2002 --          Medication List  Name Date Started Instructions   Ambien 10 mg oral tablet 06/05/2020 take 1 tablet (10 mg) by oral route once daily  at bedtime for 90 days   aspirin 81 mg oral tablet,chewable 10/20/2017 chew 1 tablet (81 mg) by oral route once daily   Ativan 0.5 mg oral tablet 2020 take 1 tablet (0.5 mg) by oral route 2 times per day as needed for 30 days   B Complex oral  --    bupropion HCl 150 mg oral tablet sustained-release 12 hr 2020 TAKE 1 TABLET BY MOUTH TWICE DAILY   levothyroxine 75 mcg oral tablet 2020 TAKE 1 TABLET BY MOUTH EVERY DAY   liothyronine 5 mcg oral tablet 2020 TAKE 1/2 TO 1 TABLET BY ORAL ROUTE 2 TIMES A DAY   metformin 500 mg oral tablet 2019 TAKE ONE TABLET BY MOUTH TWICE A DAY WITH MORNING AND EVENING MEALS   Protonix 40 mg oral tablet,delayed release (DR/EC) 2020 take 1 tablet (40 mg) by oral route once daily for 90 days   spironolactone 100 mg oral tablet 2020 take 1 tablet (100 mg) by oral route once daily for 90 days   Vyvanse 60 mg oral capsule 2020 take 1 capsule (60 mg) by oral route once daily in the morning         Allergy List  Allergen Name Date Reaction Notes   Codeine Sulfate --  --  --          Family Medical History  Disease Name Relative/Age Notes   Liver Neoplasm, Malignant Uncle/   --    Heart Disease  grandparent   Diabetes, unspecified type  grandparents         Reproductive History  Menstrual   Age Menarche: 9   Pregnancy Summary   Total Pregnancies: 3 Full Term: 2 Premature: 0   Ab Induced: 0 Ab Spontaneous: 0 Ectopics: 0   Multiples: 0 Livin         Social History  Finding Status Start/Stop Quantity Notes   Active but no formal exercise --  --/-- --  --    Alcohol Never --/-- --  2018 - 2018 -     --  --/-- --  --    No known infection risk --  --/-- --  --    Tobacco Former  1/2 pk/day quit in .         Immunizations  NameDate Admin Mfg Trade Name Lot Number Route Inj VIS Given VIS Publication   Hepatitis A02019 TRI HAVRIX-ADULT [id number] IM LD 2019    Comments: pt tolerated well   Hepatitis A12018 TRI  "HAVRIX-ADULT [id number] IM RA 12/07/2018 07/20/2016   Comments: Patient tolerated well.   Iohliuobx64/08/2018 PMC Fluzone Quadrivalent [id number] IM LA 11/08/2018 08/07/2015   Comments: Patient tolerated well.         Review of Systems  · Constitutional  o Admits  o : fatigue, weight gain  o Denies  o : fever, weight loss  · Cardiovascular  o Denies  o : lower extremity edema, claudication, chest pressure, palpitations  · Respiratory  o Denies  o : shortness of breath, wheezing, cough, hemoptysis, dyspnea on exertion  · Gastrointestinal  o Denies  o : nausea, vomiting, diarrhea, constipation, abdominal pain      Vitals  Date Time BP Position Site L\R Cuff Size HR RR TEMP (F) WT  HT  BMI kg/m2 BSA m2 O2 Sat FR L/min FiO2        12/11/2020 10:47 /67 Sitting    93 - R   247lbs 6oz 5'  4\" 42.46 2.25 97 %            Physical Examination  · Constitutional  o Appearance  o : overweight, well developed  · Head and Face  o Head  o : normocephalic, atraumatic  · Neck  o Inspection/Palpation  o : normal appearance, no masses or tenderness, trachea midline  o Thyroid  o : gland size normal, nontender, no nodules or masses present on palpation  · Respiratory  o Respiratory Effort  o : breathing unlabored  o Inspection of Chest  o : chest rise symmetric bilaterally  o Auscultation of Lungs  o : clear to auscultation bilaterally throughout inspiration and expiration  · Cardiovascular  o Heart  o :   § Auscultation of Heart  § : regular rate and rhythm, no murmurs, gallops or rubs  o Peripheral Vascular System  o :   § Extremities  § : no edema  · Lymphatic  o Neck  o : no cervical lymphadenopathy, no supraclavicular lymphadenopathy  · Psychiatric  o Mood and Affect  o : mood normal, affect appropriate          Assessment  · Fatigue     780.79/R53.83  · Hypothyroidism     244.9/E03.9  · Class 3 severe obesity due to excess calories with serious comorbidity and body mass index (BMI) of 40.0 to 44.9 in adult       Morbid (severe) " obesity due to excess calories     278.01/E66.01  Body mass index (BMI) 40.0-44.9, adult     278.01/Z68.41  · Need for influenza vaccination     V04.81/Z23  · ADD (attention deficit disorder)     314.00/F98.8  · Insomnia     780.52/G47.00  · Anxiety     300.02/F41.1  · Depression     296.31      Plan  · Orders  o ACO-14: Influenza immunization was not administered for reasons documented () - V04.81/Z23 - 12/11/2020   rcvd 11/20  o Free T4 (32724) - - 12/11/2020  o Physical, Primary Care Panel (CBC, CMP, Lipid, TSH) [facility] (02457, 48745, 80996, 72420) - - 12/11/2020  o Urinalysis with Reflex Microscopy ([facility]) (34958) - - 12/11/2020  o Vitamin D (25-Hydroxy) Level (55462) - - 12/11/2020  o TREVA Report ([facility]AS[facility]) - - 12/11/2020  o ACO-39: Current medications updated and reviewed (1159F, ) - - 12/11/2020  o ACO-20: Screening Mammography documented and reviewed [facility] () - - 12/11/2020  o Bariatric Consultation (DARIUS) - 278.01/E66.01, 278.01/Z68.41 - 12/11/2020   [facility]  · Medications  o Ativan 0.5 mg oral tablet   SIG: take 1 tablet (0.5 mg) by oral route 2 times per day as needed for 30 days   DISP: (30) Tablet with 0 refills  Refilled on 12/11/2020     o levothyroxine 75 mcg oral tablet   SIG: TAKE 1 TABLET BY MOUTH EVERY DAY   DISP: (90) Tablet with 1 refills  Refilled on 12/11/2020     o liothyronine 5 mcg oral tablet   SIG: TAKE 1/2 TO 1 TABLET BY ORAL ROUTE 2 TIMES A DAY   DISP: (180) Tablet with 3 refills  Refilled on 12/11/2020     o Protonix 40 mg oral tablet,delayed release (DR/EC)   SIG: take 1 tablet (40 mg) by oral route once daily for 90 days   DISP: (90) Tablet with 3 refills  Refilled on 12/11/2020     o spironolactone 100 mg oral tablet   SIG: take 1 tablet (100 mg) by oral route once daily for 90 days   DISP: (90) Tablet with 3 refills  Refilled on 12/11/2020     o Medications have been Reconciled  o Transition of Care or Provider Policy  · Instructions  o Obtained a written consent  for TREVA query. Discussed the risk and benefits of the use of controlled substances with the patient, including the risk of tolerance and drug dependence. The patient has been counseled on the need to have an exit strategy, including potentially discontinuing the use of controlled substances. TREVA has or will be reviewed as soon as it becomes avaliable.  o See note for indication of controlled substance. Treva and drug screen have been reviewed. Controlled substance agreement signed and scanned into chart. After discussion of risks and benefits of medication, I have determined patient is suitable for Rx while demonstrating the ability to safely follow and administer the medication plan. Patient understands the expectation that medication directions cannot be adjusted without a provider's written approval.   o Take all medications as prescribed/directed.  o Patient instructed/educated on their diet and exercise program.  o Patient was educated/instructed on their diagnosis, treatment and medications prior to discharge from the clinic today.  o Patient counseled to reduce calorie intake.  o Patient was instructed to exercise regularly.  o Discussed Covid-19 precautions including, but not limited to, social distancing, avoid touching your face, and hand washing.   · Disposition  o Call or Return if symptoms worsen or persist.  o F/U in 3 months.  o Care Transition            Electronically Signed by: Solomon Coleman PA-C -Author on December 13, 2020 06:53:54 PM

## 2021-05-13 NOTE — PROGRESS NOTES
Progress Note      Patient Name: Audra Daniel   Patient ID: [id number]   Sex: Female   YOB: 1980    Primary Care Provider: Solomon Coleman PA-C   Referring Provider: Solomon Coleman PA-C    Visit Date: September 8, 2020    Provider: Solomon Coleman PA-C   Location: [facility]   Location Address: [address] [place] [address], [address]  [place], [place]  [id number]   Location Phone: [phone]          Chief Complaint  · 3 month follow up      History Of Present Illness  Audra Daniel is a 40 year old /White female who presents for evaluation and treatment of:      Patient is here today for a 3 month follow up. Patient has no concerns to address.    Last labs 3/20/20    Last pap 8/2020  Mammogram - [facility] - GYN  Pap will be every 3 years    CT Pelvis - was denied by her insurance per pt    Pt takes her med PRN - she does not take her Vyvanse when she is off work.  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 Mo-GYN   Pedal edema 03/10/2016 --          Past Surgical History  Procedure Name Date Notes   Breast augmentation 2006 --    Breast lift 2005 --    Carpal tunnel release --  --    D & C 2011 --    trigger finger release --  thumb   [place] Tooth Extraction 2002 --          Medication List  Name Date Started Instructions   Ambien 10 mg oral tablet 06/05/2020 take 1 tablet (10 mg) by oral route once daily at bedtime for 90 days   aspirin 81 mg oral tablet,chewable 10/20/2017 chew 1  tablet (81 mg) by oral route once daily   Ativan 0.5 mg oral tablet 2020 take 1 tablet (0.5 mg) by oral route 2 times per day as needed for 30 days   B Complex oral  --    bupropion HCl 150 mg oral tablet sustained-release 12 hr 2020 TAKE 1 TABLET BY MOUTH TWICE DAILY   fluoxetine 40 mg oral capsule 2019 take 1 capsule (40 mg) by oral route once daily for 90 days   levothyroxine 75 mcg oral tablet 2020 TAKE 1 TABLET BY MOUTH EVERY DAY   liothyronine 5 mcg oral tablet 2019 TAKE 1/2 TO 1 TABLET BY ORAL ROUTE 2 TIMES A DAY   metformin 500 mg oral tablet 2019 TAKE ONE TABLET BY MOUTH TWICE A DAY WITH MORNING AND EVENING MEALS   metoprolol succinate 25 mg oral tablet extended release 24 hr 07/15/2020 TAKE 1 TABLET(25 MG) BY MOUTH EVERY DAY   Protonix 40 mg oral tablet,delayed release (DR/EC) 2019 take 1 tablet (40 mg) by oral route once daily for 90 days   spironolactone 100 mg oral tablet 2019 take 1 tablet (100 mg) by oral route once daily for 90 days   Topamax 50 mg oral tablet 2020 take 1 tablet (50 mg) by oral route 2 times per day for 30 days   Vyvanse 60 mg oral capsule 2020 take 1 capsule (60 mg) by oral route once daily in the morning         Allergy List  Allergen Name Date Reaction Notes   Codeine Sulfate --  --  --          Family Medical History  Disease Name Relative/Age Notes   Liver Neoplasm, Malignant Uncle/   --    Heart Disease  grandparent   Diabetes, unspecified type  grandparents         Reproductive History  Menstrual   Age Menarche: 9   Pregnancy Summary   Total Pregnancies: 3 Full Term: 2 Premature: 0   Ab Induced: 0 Ab Spontaneous: 0 Ectopics: 0   Multiples: 0 Livin         Social History  Finding Status Start/Stop Quantity Notes   Active but no formal exercise --  --/-- --  --    Alcohol Never --/-- --  2018 - 2018 -     --  --/-- --  --    No known infection risk --  --/-- --  --    Tobacco Former   "pk/day quit in 2011.         Immunizations  NameDate Admin Mfg Trade Name Lot Number Route Inj VIS Given VIS Publication   Hepatitis A08/23/2019 SKB HAVRIX-ADULT [id number] IM LD 08/23/2019    Comments: pt tolerated well   Hepatitis A12/07/2018 SKB HAVRIX-ADULT [id number] IM RA 12/07/2018 07/20/2016   Comments: Patient tolerated well.   Lazwzmbzd12/08/2018 PMC Fluzone Quadrivalent [id number] IM LA 11/08/2018 08/07/2015   Comments: Patient tolerated well.         Review of Systems  · Constitutional  o Admits  o : fatigue, weight gain  o Denies  o : fever, weight loss  · Cardiovascular  o Denies  o : lower extremity edema, claudication, chest pressure, palpitations  · Respiratory  o Denies  o : shortness of breath, wheezing, cough, hemoptysis, dyspnea on exertion  · Gastrointestinal  o Denies  o : nausea, vomiting, diarrhea, constipation, abdominal pain      Vitals  Date Time BP Position Site L\R Cuff Size HR RR TEMP (F) WT  HT  BMI kg/m2 BSA m2 O2 Sat        09/08/2020 11:06 /61 Sitting    84 - R   237lbs 0oz 5'  4\" 40.68 2.2 98 %          Physical Examination  · Constitutional  o Appearance  o : overweight, well developed  · Head and Face  o Head  o : normocephalic, atraumatic  · Ears, Nose, Mouth and Throat  o Ears  o :   § External Ears  § : external auditory canal appearance normal, no discharge present  § Otoscopic Examination  § : tympanic membranes pearly white/gray bilaterally  o Nose  o :   § External Nose  § : no lesions noted  § Nasopharynx  § : no discharge present  o Oral Cavity  o :   § Oral Mucosa  § : oral mucosa light pink  o Throat  o :   § Oropharynx  § : tonsils without exudate, no palatal petechiae  · Neck  o Inspection/Palpation  o : normal appearance, no masses or tenderness, trachea midline  o Thyroid  o : gland size normal, nontender, no nodules or masses present on palpation  · Respiratory  o Respiratory Effort  o : breathing unlabored  o Inspection of Chest  o : chest rise symmetric " bilaterally  o Auscultation of Lungs  o : clear to auscultation bilaterally throughout inspiration and expiration  · Cardiovascular  o Heart  o :   § Auscultation of Heart  § : regular rate and rhythm, no murmurs, gallops or rubs  o Peripheral Vascular System  o :   § Extremities  § : no edema  · Lymphatic  o Neck  o : no cervical lymphadenopathy, no supraclavicular lymphadenopathy  · Psychiatric  o Mood and Affect  o : mood normal, affect appropriate          Results  · In-Office Procedures  o Lab procedure  § IOP - Urine Drug Screen In-House [facility] ([address])   § Amphetamines Ur Ql: Negative   § Barbiturates Ur Ql: Negative   § Buprenorphine+Nor Ur Ql Scn: Negative   § Benzodiaz Ur Ql: Negative   § Cocaine Ur Ql: Negative   § Methadone Ur Ql: Negative   § Methamphet Ur Ql: Negative   § MDMA Ur Ql Scn: Negative   § Opiates Ur Ql: Negative   § Oxycodone Ur Ql: Negative   § PCP Ur Ql: Negative   § THC Ur Ql: Negative   § Temp in Range?: Within/Acceptable   § Control Seen?: Yes       Assessment  · Hypothyroidism     244.9/E03.9  · Class 3 severe obesity due to excess calories with serious comorbidity and body mass index (BMI) of 40.0 to 44.9 in adult       Morbid (severe) obesity due to excess calories     278.01/E66.01  Body mass index (BMI) 40.0-44.9, adult     278.01/Z68.41  · ADD (attention deficit disorder)     314.00/F98.8  · MTHFR mutation     270.4/E72.12  · Anxiety     300.02/F41.1  · Depression     296.31      Plan  · Orders  o TREVA Report (KASPR) - - 09/08/2020  o ACO-39: Current medications updated and reviewed () - - 09/08/2020  o Urine Drug Screen ([facility]) ([address]) - - 09/08/2020  · Medications  o Vyvanse 60 mg oral capsule   SIG: take 1 capsule (60 mg) by oral route once daily in the morning   DISP: (30) capsules with 0 refills  Refilled on 09/08/2020     o Medications have been Reconciled  o Transition of Care or Provider Policy  · Instructions  o Obtained a written consent for TREVA query. Discussed the  risk and benefits of the use of controlled substances with the patient, including the risk of tolerance and drug dependence. The patient has been counseled on the need to have an exit strategy, including potentially discontinuing the use of controlled substances. TREVA has or will be reviewed as soon as it becomes avaliable.  o Take all medications as prescribed/directed.  o Patient was educated/instructed on their diagnosis, treatment and medications prior to discharge from the clinic today.  o Discussed Covid-19 precautions including, but not limited to, social distancing, avoid touching your face, and hand washing.   · Disposition  o Call or Return if symptoms worsen or persist.  o F/U in 3 months.  o Care Transition            Electronically Signed by: Solomon Coleman PA-C -Author on September 8, 2020 01:53:15 PM

## 2021-05-14 VITALS
OXYGEN SATURATION: 97 % | BODY MASS INDEX: 42.23 KG/M2 | HEART RATE: 93 BPM | WEIGHT: 247.37 LBS | HEIGHT: 64 IN | DIASTOLIC BLOOD PRESSURE: 67 MMHG | SYSTOLIC BLOOD PRESSURE: 113 MMHG

## 2021-05-14 VITALS
SYSTOLIC BLOOD PRESSURE: 125 MMHG | HEIGHT: 64 IN | HEART RATE: 84 BPM | OXYGEN SATURATION: 98 % | DIASTOLIC BLOOD PRESSURE: 61 MMHG | WEIGHT: 237 LBS | BODY MASS INDEX: 40.46 KG/M2

## 2021-05-14 VITALS
SYSTOLIC BLOOD PRESSURE: 127 MMHG | HEART RATE: 104 BPM | OXYGEN SATURATION: 98 % | BODY MASS INDEX: 42.37 KG/M2 | WEIGHT: 248.19 LBS | DIASTOLIC BLOOD PRESSURE: 79 MMHG | HEIGHT: 64 IN

## 2021-05-14 NOTE — PROGRESS NOTES
Progress Note      Patient Name: Audra Daniel   Patient ID: [id number]   Sex: Female   YOB: 1980    Primary Care Provider: Solomon Coleman PA-C   Referring Provider: Solomon Coleman PA-C    Visit Date: March 12, 2021    Provider: Solomon Coleman PA-C   Location: [facility]   Location Address: [address] [place] [address], [address]  [place], [place]  [id number]   Location Phone: [phone]          Chief Complaint  · 3 month follow up      History Of Present Illness  Audra Daniel is a 41 year old /White female who presents for evaluation and treatment of: 3 month follow up      Pt presents today for a 3 month follow up.     Pt offers no complaints at this time, states she is doing well.     Labs-12/11/20  Pap-2020 Dr. Mo  Mammo-8/19/20  Juan Alberto-12/11/20  UDS-9/8/20  Flu-10/2020 [facility]    Pt is doing well currently    ADD - controlled with adderall 20mg BID  Hypothyroidism - synthroid 75mcg QD well controlled    Pt has tried amatiza and linzess without success.  Trulance worked well for her chronic idiopathic constipation.       Past Medical History  Disease Name Date Onset Notes   ADD (attention deficit disorder) 10/31/2017 --    Anxiety --  --    Anxiety disorder 11/17/2016 --    Breast implant rupture 2016 --    Depression --  --    Hypothyroidism 08/15/2019 --    Insomnia 07/14/2015 --    Insomnia, unspecified 02/17/2017 --    Major depressive disorder 11/17/2016 --    MTHFR mutation 06/28/2016 --    Obesity 11/17/2016 --    Pap smear for cervical cancer screening 08/2020 Dr. Mo-GYN   Pedal edema 03/10/2016 --          Past Surgical History  Procedure Name Date Notes   Breast augmentation 2006 --    Breast lift 2005 --    Carpal tunnel release --  --    D & C 2011 --    trigger finger release --  thumb   [place] Tooth Extraction 2002 --          Medication List  Name Date Started Instructions   Adderall 20 mg oral tablet 01/27/2021 take 1 tablet (20 mg) by oral route 2  "times per day before breakfast and at noon for 30 days   Ambien 10 mg oral tablet 2020 take 1 tablet (10 mg) by oral route once daily at bedtime for 90 days   aspirin 81 mg oral tablet,chewable 10/20/2017 chew 1 tablet (81 mg) by oral route once daily   Ativan 0.5 mg oral tablet 2020 take 1 tablet (0.5 mg) by oral route 2 times per day as needed for 30 days   bupropion HCl 150 mg oral tablet sustained-release 12 hr 2021 TAKE 1 TABLET BY MOUTH TWICE DAILY   fluoxetine 40 mg oral capsule  take 1 capsule (40 mg) by oral route once daily in the evening   levothyroxine 75 mcg oral tablet 2021 TAKE 1 TABLET BY MOUTH EVERY DAY   liothyronine 5 mcg oral tablet 2020 TAKE 1/2 TO 1 TABLET BY ORAL ROUTE 2 TIMES A DAY   metformin 500 mg oral tablet 2019 TAKE ONE TABLET BY MOUTH TWICE A DAY WITH MORNING AND EVENING MEALS   Novofine 32 32 gauge x 1/4\" miscellaneous needle 2021 (Pen Needle Tips) UAD once QD   Protonix 40 mg oral tablet,delayed release (DR/EC) 2020 take 1 tablet (40 mg) by oral route once daily for 90 days   Saxenda 3 mg/0.5 mL (18 mg/3 mL) subcutaneous pen injector 2021 inject 3 mg by subcutaneous route once daily in the abdomen, thigh, or upper arm   spironolactone 100 mg oral tablet 2020 take 1 tablet (100 mg) by oral route once daily for 90 days   Vitamin D2 1,250 mcg (50,000 unit) oral capsule 2020 take 1 capsule by oral route every 7 days         Allergy List  Allergen Name Date Reaction Notes   Codeine Sulfate --  --  --        Allergies Reconciled  Family Medical History  Disease Name Relative/Age Notes   Liver Neoplasm, Malignant Uncle/   --    Heart Disease  grandparent   Diabetes, unspecified type  grandparents         Reproductive History  Menstrual   Age Menarche: 9   Pregnancy Summary   Total Pregnancies: 3 Full Term: 2 Premature: 0   Ab Induced: 0 Ab Spontaneous: 0 Ectopics: 0   Multiples: 0 Livin         Social History  Finding " "Status Start/Stop Quantity Notes   Active but no formal exercise --  --/-- --  --    Alcohol Never --/-- --  06/22/2018 - 03/22/2018 -     --  --/-- --  --    No known infection risk --  --/-- --  --    Tobacco Former 17/32 1/2 pk/day quit in 2011.         Immunizations  NameDate Admin Mfg Trade Name Lot Number Route Inj VIS Given VIS Publication   Hepatitis A08/23/2019 SKB HAVRIX-ADULT [id number] IM LD 08/23/2019    Comments: pt tolerated well   Hepatitis A12/07/2018 SKB HAVRIX-ADULT [id number] IM RA 12/07/2018 07/20/2016   Comments: Patient tolerated well.   Jxoemklrw50/08/2018 [facility] Fluzone Quadrivalent [id number] IM LA 11/08/2018 08/07/2015   Comments: Patient tolerated well.         Review of Systems  · Constitutional  o Denies  o : fever, fatigue, weight loss, weight gain  · Cardiovascular  o Denies  o : lower extremity edema, claudication, chest pressure, palpitations  · Respiratory  o Denies  o : shortness of breath, wheezing, cough, hemoptysis, dyspnea on exertion  · Gastrointestinal  o Denies  o : nausea, vomiting, diarrhea, constipation, abdominal pain      Vitals  Date Time BP Position Site L\R Cuff Size HR RR TEMP (F) WT  HT  BMI kg/m2 BSA m2 O2 Sat FR L/min FiO2 HC       03/12/2021 11:02 /79 Sitting    104 - R   248lbs 3.2oz 5'  4\" 42.6 2.25 98 %            Physical Examination  · Constitutional  o Appearance  o : overweight, well developed, alert, in no acute distress  · Head and Face  o Head  o : normocephalic, atraumatic  · Neck  o Inspection/Palpation  o : normal appearance, no masses or tenderness, trachea midline  o Thyroid  o : gland size normal, nontender, no nodules or masses present on palpation  · Respiratory  o Respiratory Effort  o : breathing unlabored  o Inspection of Chest  o : chest rise symmetric bilaterally  o Auscultation of Lungs  o : clear to auscultation bilaterally throughout inspiration and expiration  · Cardiovascular  o Heart  o :   § Auscultation of Heart  § : regular rate and " rhythm, no murmurs, gallops or rubs  o Peripheral Vascular System  o :   § Extremities  § : no edema  · Lymphatic  o Neck  o : no cervical lymphadenopathy, no supraclavicular lymphadenopathy  · Psychiatric  o Mood and Affect  o : mood normal, affect appropriate          Assessment  · Hypothyroidism     244.9/E03.9  · Screening for depression     V79.0/Z13.89  · Need for influenza vaccination     V04.81/Z23  · ADD (attention deficit disorder)     314.00/F98.8  · Insomnia     780.52/G47.00  · MTHFR mutation     270.4/E72.12  · Anxiety     300.02/F41.1  · Depression     296.31      Plan  · Orders  o ACO-14: Influenza immunization administered or previously received () - V04.81/Z23 - 03/12/2021   10/2020 [facility]  o ACO-39: Current medications updated and reviewed (, 1159F) - - 03/12/2021  o TREVA Report (KASPR) - - 03/12/2021  · Medications  o Saxenda 3 mg/0.5 mL (18 mg/3 mL) subcutaneous pen injector   SIG: inject 3 mg by subcutaneous route once daily in the abdomen, thigh, or upper arm   DISP: (4) Pre-filled Pen Syringe with 11 refills  Adjusted on 03/12/2021     o bupropion HCl 150 mg oral tablet sustained-release 12 hr   SIG: TAKE 1 TABLET BY MOUTH TWICE DAILY   DISP: (180) Tablet with 1 refills  Refilled on 03/12/2021     o levothyroxine 75 mcg oral tablet   SIG: TAKE 1 TABLET BY MOUTH EVERY DAY   DISP: (90) Tablet with 1 refills  Refilled on 03/12/2021     o Medications have been Reconciled  o Transition of Care or Provider Policy  · Instructions  o Depression Screen completed and scanned into the EMR under the designated folder within the patient's documents.  o Today's PHQ-9 result is ___  o Obtained a written consent for TREVA query. Discussed the risk and benefits of the use of controlled substances with the patient, including the risk of tolerance and drug dependence. The patient has been counseled on the need to have an exit strategy, including potentially discontinuing the use of controlled  substances. TREVA has or will be reviewed as soon as it becomes avaliable.  o See note for indication of controlled substance. Treva and drug screen have been reviewed. Controlled substance agreement signed and scanned into chart. After discussion of risks and benefits of medication, I have determined patient is suitable for Rx while demonstrating the ability to safely follow and administer the medication plan. Patient understands the expectation that medication directions cannot be adjusted without a provider's written approval.   o Take all medications as prescribed/directed.  o Patient instructed/educated on their diet and exercise program.  o Patient was educated/instructed on their diagnosis, treatment and medications prior to discharge from the clinic today.  o Patient counseled to reduce calorie intake.  o Patient was instructed to exercise regularly.  o Discussed Covid-19 precautions including, but not limited to, social distancing, avoid touching your face, and hand washing.   · Disposition  o Call or Return if symptoms worsen or persist.  o F/U in 3 months.  o Care Transition            Electronically Signed by: Solomon Coleman PA-C -Author on March 12, 2021 12:24:13 PM

## 2021-05-15 VITALS
WEIGHT: 224.37 LBS | BODY MASS INDEX: 38.3 KG/M2 | OXYGEN SATURATION: 99 % | SYSTOLIC BLOOD PRESSURE: 141 MMHG | HEART RATE: 109 BPM | HEIGHT: 64 IN | DIASTOLIC BLOOD PRESSURE: 81 MMHG

## 2021-05-15 VITALS
HEART RATE: 90 BPM | SYSTOLIC BLOOD PRESSURE: 100 MMHG | OXYGEN SATURATION: 98 % | BODY MASS INDEX: 36.02 KG/M2 | WEIGHT: 211 LBS | HEIGHT: 64 IN | DIASTOLIC BLOOD PRESSURE: 54 MMHG

## 2021-05-15 VITALS
DIASTOLIC BLOOD PRESSURE: 68 MMHG | SYSTOLIC BLOOD PRESSURE: 118 MMHG | HEIGHT: 64 IN | BODY MASS INDEX: 40.29 KG/M2 | WEIGHT: 236 LBS | HEART RATE: 78 BPM

## 2021-05-15 VITALS
BODY MASS INDEX: 37.39 KG/M2 | SYSTOLIC BLOOD PRESSURE: 110 MMHG | WEIGHT: 219 LBS | DIASTOLIC BLOOD PRESSURE: 72 MMHG | OXYGEN SATURATION: 100 % | HEART RATE: 99 BPM | HEIGHT: 64 IN

## 2021-05-15 VITALS
OXYGEN SATURATION: 99 % | HEART RATE: 92 BPM | HEIGHT: 64 IN | WEIGHT: 223.12 LBS | TEMPERATURE: 98.1 F | BODY MASS INDEX: 38.09 KG/M2 | SYSTOLIC BLOOD PRESSURE: 113 MMHG | DIASTOLIC BLOOD PRESSURE: 48 MMHG

## 2021-05-15 VITALS
HEIGHT: 64 IN | SYSTOLIC BLOOD PRESSURE: 133 MMHG | BODY MASS INDEX: 40.63 KG/M2 | HEART RATE: 81 BPM | WEIGHT: 238 LBS | DIASTOLIC BLOOD PRESSURE: 75 MMHG | OXYGEN SATURATION: 99 %

## 2021-05-16 VITALS
BODY MASS INDEX: 37.39 KG/M2 | HEART RATE: 104 BPM | SYSTOLIC BLOOD PRESSURE: 106 MMHG | TEMPERATURE: 98.6 F | HEIGHT: 64 IN | DIASTOLIC BLOOD PRESSURE: 62 MMHG | OXYGEN SATURATION: 98 % | RESPIRATION RATE: 18 BRPM | WEIGHT: 219 LBS

## 2021-05-16 VITALS
HEART RATE: 83 BPM | OXYGEN SATURATION: 98 % | BODY MASS INDEX: 34.15 KG/M2 | WEIGHT: 200 LBS | SYSTOLIC BLOOD PRESSURE: 113 MMHG | DIASTOLIC BLOOD PRESSURE: 42 MMHG | HEIGHT: 64 IN

## 2021-05-16 VITALS
HEIGHT: 64 IN | WEIGHT: 207.37 LBS | BODY MASS INDEX: 35.4 KG/M2 | OXYGEN SATURATION: 98 % | HEART RATE: 92 BPM | SYSTOLIC BLOOD PRESSURE: 95 MMHG | DIASTOLIC BLOOD PRESSURE: 50 MMHG

## 2021-05-16 VITALS
HEIGHT: 64 IN | HEART RATE: 107 BPM | DIASTOLIC BLOOD PRESSURE: 53 MMHG | BODY MASS INDEX: 37.56 KG/M2 | SYSTOLIC BLOOD PRESSURE: 106 MMHG | TEMPERATURE: 98.6 F | OXYGEN SATURATION: 99 % | WEIGHT: 220 LBS | RESPIRATION RATE: 20 BRPM

## 2021-05-16 VITALS
HEIGHT: 64 IN | HEART RATE: 104 BPM | OXYGEN SATURATION: 98 % | BODY MASS INDEX: 37.77 KG/M2 | WEIGHT: 221.25 LBS | DIASTOLIC BLOOD PRESSURE: 51 MMHG | SYSTOLIC BLOOD PRESSURE: 113 MMHG

## 2021-06-22 ENCOUNTER — OFFICE VISIT (OUTPATIENT)
Dept: FAMILY MEDICINE CLINIC | Facility: CLINIC | Age: 41
End: 2021-06-22

## 2021-06-22 ENCOUNTER — HOSPITAL ENCOUNTER (OUTPATIENT)
Dept: GENERAL RADIOLOGY | Facility: HOSPITAL | Age: 41
Discharge: HOME | End: 2021-06-22
Admitting: PHYSICIAN ASSISTANT

## 2021-06-22 VITALS
BODY MASS INDEX: 42.27 KG/M2 | DIASTOLIC BLOOD PRESSURE: 79 MMHG | HEART RATE: 91 BPM | WEIGHT: 247.6 LBS | SYSTOLIC BLOOD PRESSURE: 132 MMHG | OXYGEN SATURATION: 97 % | HEIGHT: 64 IN

## 2021-06-22 DIAGNOSIS — K59.00 CONSTIPATION, UNSPECIFIED CONSTIPATION TYPE: ICD-10-CM

## 2021-06-22 DIAGNOSIS — R09.1 PLEURISY: ICD-10-CM

## 2021-06-22 DIAGNOSIS — F90.9 ATTENTION DEFICIT HYPERACTIVITY DISORDER (ADHD), UNSPECIFIED ADHD TYPE: Primary | ICD-10-CM

## 2021-06-22 DIAGNOSIS — E03.9 HYPOTHYROIDISM, UNSPECIFIED TYPE: ICD-10-CM

## 2021-06-22 DIAGNOSIS — G47.00 INSOMNIA, UNSPECIFIED TYPE: ICD-10-CM

## 2021-06-22 DIAGNOSIS — E66.01 CLASS 3 SEVERE OBESITY DUE TO EXCESS CALORIES WITH SERIOUS COMORBIDITY AND BODY MASS INDEX (BMI) OF 40.0 TO 44.9 IN ADULT (HCC): ICD-10-CM

## 2021-06-22 PROBLEM — T85.43XA BREAST IMPLANT RUPTURE: Status: ACTIVE | Noted: 2021-06-22

## 2021-06-22 PROCEDURE — 99214 OFFICE O/P EST MOD 30 MIN: CPT | Performed by: PHYSICIAN ASSISTANT

## 2021-06-22 PROCEDURE — 71046 X-RAY EXAM CHEST 2 VIEWS: CPT

## 2021-06-22 RX ORDER — BUPROPION HYDROCHLORIDE 150 MG/1
150 TABLET, EXTENDED RELEASE ORAL 2 TIMES DAILY
COMMUNITY
Start: 2021-06-06 | End: 2021-07-14 | Stop reason: SDUPTHER

## 2021-06-22 RX ORDER — ERGOCALCIFEROL 1.25 MG/1
50000 CAPSULE ORAL
COMMUNITY
Start: 2021-06-04 | End: 2021-07-14 | Stop reason: SDUPTHER

## 2021-06-22 RX ORDER — LEVOTHYROXINE SODIUM 0.07 MG/1
75 TABLET ORAL DAILY
COMMUNITY
Start: 2021-06-04 | End: 2021-07-14 | Stop reason: SDUPTHER

## 2021-06-22 RX ORDER — LORAZEPAM 0.5 MG/1
0.5 TABLET ORAL EVERY 8 HOURS PRN
COMMUNITY

## 2021-06-22 RX ORDER — ASPIRIN 81 MG/1
81 TABLET ORAL DAILY
COMMUNITY

## 2021-06-22 RX ORDER — SPIRONOLACTONE 100 MG/1
TABLET, FILM COATED ORAL
COMMUNITY
Start: 2021-06-06 | End: 2021-07-14 | Stop reason: SDUPTHER

## 2021-06-22 RX ORDER — PANTOPRAZOLE SODIUM 40 MG/1
TABLET, DELAYED RELEASE ORAL
COMMUNITY
Start: 2021-06-06 | End: 2021-07-14 | Stop reason: SDUPTHER

## 2021-06-22 RX ORDER — LIRAGLUTIDE 6 MG/ML
INJECTION, SOLUTION SUBCUTANEOUS
COMMUNITY
Start: 2021-05-28 | End: 2021-08-06

## 2021-06-22 RX ORDER — PLECANATIDE 3 MG/1
1 TABLET ORAL DAILY
Qty: 30 TABLET | Refills: 5 | Status: CANCELLED | OUTPATIENT
Start: 2021-06-22

## 2021-06-22 RX ORDER — DEXTROAMPHETAMINE SACCHARATE, AMPHETAMINE ASPARTATE, DEXTROAMPHETAMINE SULFATE AND AMPHETAMINE SULFATE 5; 5; 5; 5 MG/1; MG/1; MG/1; MG/1
20 TABLET ORAL 2 TIMES DAILY
Qty: 60 TABLET | Refills: 0 | Status: SHIPPED | OUTPATIENT
Start: 2021-06-22 | End: 2021-09-02 | Stop reason: SDUPTHER

## 2021-06-22 RX ORDER — LIOTHYRONINE SODIUM 5 UG/1
TABLET ORAL
COMMUNITY
Start: 2021-06-04 | End: 2021-07-14 | Stop reason: SDUPTHER

## 2021-06-22 NOTE — PROGRESS NOTES
"Chief Complaint  ADD (3 month follow up), Anxiety, Depression, and Hypothyroidism    Subjective          Audra Daniel presents to [facility]  History of Present Illness  Pt presents today for 3 month follow up.    Pt states for the last week 1/2 when she takes a deep breath she has a pain in her left lower back.  No fever, chills.    Pt states the pain will stay in back area only with deep breaths on the left side.    Requesting refill on Adderall 20mg.  Pt states that her quality of life is better adderall.    Pfizer-no issues    Labs 12/20  andres 3/12/21  uds 9/8/20    No CP, SOA, HA    Pt is needing refill on trulance for constipation, she has tried many different meds in the past.    Objective   Vital Signs:   /79 (BP Location: Left arm)   Pulse 91   Ht 162.6 cm (64\")   Wt 112 kg (247 lb 9.6 oz)   SpO2 97%   BMI 42.50 kg/m²     Physical Exam  Vitals and nursing note reviewed.   Constitutional:       Appearance: Normal appearance. She is obese.   HENT:      Head: Normocephalic and atraumatic.   Cardiovascular:      Rate and Rhythm: Normal rate and regular rhythm.   Pulmonary:      Effort: Pulmonary effort is normal.      Breath sounds: Normal breath sounds.   Musculoskeletal:      Cervical back: Neck supple.   Neurological:      Mental Status: She is alert.   Psychiatric:         Mood and Affect: Mood normal.         Behavior: Behavior normal.        Result Review :                     Assessment and Plan    Diagnoses and all orders for this visit:    1. Attention deficit hyperactivity disorder (ADHD), unspecified ADHD type (Primary)  Overview:  Controlled with adderall 20mg BID    Orders:  -     amphetamine-dextroamphetamine (ADDERALL) 20 MG tablet; Take 1 tablet by mouth 2 (Two) Times a Day.  Dispense: 60 tablet; Refill: 0    2. Insomnia, unspecified type  Overview:  Ambien 10mg QHS PRN well controlled      3. Hypothyroidism, unspecified " type  Overview:  lexvoxyl 75mcg QD well controlled      4. Class 3 severe obesity due to excess calories with serious comorbidity and body mass index (BMI) of 40.0 to 44.9 in adult (CMS/[facility])  Overview:  Disc diet and exercise      5. Pleurisy  -     XR Chest PA & Lateral; Future    6. Constipation, unspecified constipation type  -     Plecanatide (Trulance) 3 MG tablet; Take 1 tablet by mouth Daily.  Dispense: 30 tablet; Refill: 5       Follow Up   Return in about 3 months (around 9/22/2021).  Patient was given instructions and counseling regarding her condition or for health maintenance advice. Please see specific information pulled into the AVS if appropriate.     DORY Griffiths

## 2021-07-05 DIAGNOSIS — G47.00 INSOMNIA, UNSPECIFIED TYPE: Primary | ICD-10-CM

## 2021-07-06 RX ORDER — ZOLPIDEM TARTRATE 10 MG/1
TABLET ORAL
Qty: 90 TABLET | Refills: 1 | Status: SHIPPED | OUTPATIENT
Start: 2021-07-06 | End: 2021-12-27 | Stop reason: SDUPTHER

## 2021-07-09 DIAGNOSIS — E66.01 CLASS 3 SEVERE OBESITY DUE TO EXCESS CALORIES WITH SERIOUS COMORBIDITY AND BODY MASS INDEX (BMI) OF 40.0 TO 44.9 IN ADULT (HCC): Primary | ICD-10-CM

## 2021-07-09 RX ORDER — SEMAGLUTIDE 0.25 MG/.5ML
0.25 INJECTION, SOLUTION SUBCUTANEOUS WEEKLY
Qty: 4 PEN | Refills: 0 | Status: SHIPPED | OUTPATIENT
Start: 2021-07-09 | End: 2021-08-06

## 2021-07-14 DIAGNOSIS — F32.9 MAJOR DEPRESSIVE DISORDER, REMISSION STATUS UNSPECIFIED, UNSPECIFIED WHETHER RECURRENT: ICD-10-CM

## 2021-07-14 DIAGNOSIS — F41.9 ANXIETY DISORDER, UNSPECIFIED TYPE: ICD-10-CM

## 2021-07-14 DIAGNOSIS — K21.9 GASTROESOPHAGEAL REFLUX DISEASE WITHOUT ESOPHAGITIS: ICD-10-CM

## 2021-07-14 DIAGNOSIS — E55.9 VITAMIN D DEFICIENCY: Primary | ICD-10-CM

## 2021-07-14 DIAGNOSIS — E03.9 HYPOTHYROIDISM, UNSPECIFIED TYPE: ICD-10-CM

## 2021-07-14 RX ORDER — PANTOPRAZOLE SODIUM 40 MG/1
40 TABLET, DELAYED RELEASE ORAL DAILY
Qty: 90 TABLET | Refills: 0 | Status: SHIPPED | OUTPATIENT
Start: 2021-07-14 | End: 2021-09-08

## 2021-07-14 RX ORDER — LEVOTHYROXINE SODIUM 0.07 MG/1
75 TABLET ORAL DAILY
Qty: 90 TABLET | Refills: 1 | Status: SHIPPED | OUTPATIENT
Start: 2021-07-14 | End: 2021-11-17 | Stop reason: SDUPTHER

## 2021-07-14 RX ORDER — ERGOCALCIFEROL 1.25 MG/1
50000 CAPSULE ORAL
Qty: 5 CAPSULE | Refills: 1 | Status: SHIPPED | OUTPATIENT
Start: 2021-07-14 | End: 2021-09-14

## 2021-07-14 RX ORDER — BUPROPION HYDROCHLORIDE 150 MG/1
150 TABLET, EXTENDED RELEASE ORAL 2 TIMES DAILY
Qty: 180 TABLET | Refills: 1 | Status: SHIPPED | OUTPATIENT
Start: 2021-07-14 | End: 2022-01-10

## 2021-07-14 RX ORDER — SPIRONOLACTONE 100 MG/1
100 TABLET, FILM COATED ORAL DAILY
Qty: 90 TABLET | Refills: 1 | Status: SHIPPED | OUTPATIENT
Start: 2021-07-14 | End: 2022-01-10

## 2021-07-14 RX ORDER — LIOTHYRONINE SODIUM 5 UG/1
5 TABLET ORAL DAILY
Qty: 90 TABLET | Refills: 1 | Status: SHIPPED | OUTPATIENT
Start: 2021-07-14 | End: 2021-10-20 | Stop reason: SDUPTHER

## 2021-07-19 ENCOUNTER — TELEPHONE (OUTPATIENT)
Dept: FAMILY MEDICINE CLINIC | Facility: CLINIC | Age: 41
End: 2021-07-19

## 2021-07-19 ENCOUNTER — PATIENT OUTREACH (OUTPATIENT)
Dept: FAMILY MEDICINE CLINIC | Facility: CLINIC | Age: 41
End: 2021-07-19

## 2021-07-19 DIAGNOSIS — K58.9 IRRITABLE BOWEL SYNDROME, UNSPECIFIED TYPE: Primary | ICD-10-CM

## 2021-07-22 ENCOUNTER — TELEPHONE (OUTPATIENT)
Dept: FAMILY MEDICINE CLINIC | Facility: CLINIC | Age: 41
End: 2021-07-22

## 2021-08-06 ENCOUNTER — TELEPHONE (OUTPATIENT)
Dept: FAMILY MEDICINE CLINIC | Facility: CLINIC | Age: 41
End: 2021-08-06

## 2021-08-06 RX ORDER — SEMAGLUTIDE 1.34 MG/ML
0.25 INJECTION, SOLUTION SUBCUTANEOUS WEEKLY
Qty: 1 PEN | Refills: 0 | Status: SHIPPED | OUTPATIENT
Start: 2021-08-06 | End: 2021-08-31

## 2021-08-06 NOTE — TELEPHONE ENCOUNTER
Pt states she is unable to get the wegovy due to rd. Pt is asking if ozempic or something else could be sent in to replace it.    Thanks

## 2021-08-31 DIAGNOSIS — U07.1 COVID-19 VIRUS INFECTION: Primary | ICD-10-CM

## 2021-08-31 DIAGNOSIS — R73.9 HYPERGLYCEMIA: ICD-10-CM

## 2021-09-02 DIAGNOSIS — F90.9 ATTENTION DEFICIT HYPERACTIVITY DISORDER (ADHD), UNSPECIFIED ADHD TYPE: ICD-10-CM

## 2021-09-02 RX ORDER — DEXTROAMPHETAMINE SACCHARATE, AMPHETAMINE ASPARTATE, DEXTROAMPHETAMINE SULFATE AND AMPHETAMINE SULFATE 5; 5; 5; 5 MG/1; MG/1; MG/1; MG/1
20 TABLET ORAL 2 TIMES DAILY
Qty: 60 TABLET | Refills: 0 | Status: SHIPPED | OUTPATIENT
Start: 2021-09-02 | End: 2021-09-28 | Stop reason: SDUPTHER

## 2021-09-03 DIAGNOSIS — R73.9 HYPERGLYCEMIA: ICD-10-CM

## 2021-09-08 DIAGNOSIS — K21.9 GASTROESOPHAGEAL REFLUX DISEASE WITHOUT ESOPHAGITIS: ICD-10-CM

## 2021-09-08 RX ORDER — PANTOPRAZOLE SODIUM 40 MG/1
TABLET, DELAYED RELEASE ORAL
Qty: 90 TABLET | Refills: 1 | Status: SHIPPED | OUTPATIENT
Start: 2021-09-08 | End: 2022-03-11

## 2021-09-14 DIAGNOSIS — E55.9 VITAMIN D DEFICIENCY: ICD-10-CM

## 2021-09-14 RX ORDER — ERGOCALCIFEROL 1.25 MG/1
CAPSULE ORAL
Qty: 12 CAPSULE | Refills: 1 | Status: SHIPPED | OUTPATIENT
Start: 2021-09-14 | End: 2022-03-02 | Stop reason: SDUPTHER

## 2021-09-23 ENCOUNTER — OFFICE VISIT (OUTPATIENT)
Dept: FAMILY MEDICINE CLINIC | Facility: CLINIC | Age: 41
End: 2021-09-23

## 2021-09-23 VITALS
HEART RATE: 96 BPM | OXYGEN SATURATION: 98 % | BODY MASS INDEX: 43.16 KG/M2 | WEIGHT: 252.8 LBS | DIASTOLIC BLOOD PRESSURE: 55 MMHG | HEIGHT: 64 IN | SYSTOLIC BLOOD PRESSURE: 115 MMHG

## 2021-09-23 DIAGNOSIS — G47.00 INSOMNIA, UNSPECIFIED TYPE: Chronic | ICD-10-CM

## 2021-09-23 DIAGNOSIS — K58.9 IRRITABLE BOWEL SYNDROME, UNSPECIFIED TYPE: ICD-10-CM

## 2021-09-23 DIAGNOSIS — K21.00 GASTROESOPHAGEAL REFLUX DISEASE WITH ESOPHAGITIS WITHOUT HEMORRHAGE: Chronic | ICD-10-CM

## 2021-09-23 DIAGNOSIS — Z79.899 LONG TERM USE OF DRUG: ICD-10-CM

## 2021-09-23 DIAGNOSIS — E66.01 CLASS 3 SEVERE OBESITY DUE TO EXCESS CALORIES WITH SERIOUS COMORBIDITY AND BODY MASS INDEX (BMI) OF 40.0 TO 44.9 IN ADULT (HCC): Chronic | ICD-10-CM

## 2021-09-23 DIAGNOSIS — E03.9 HYPOTHYROIDISM, UNSPECIFIED TYPE: Primary | Chronic | ICD-10-CM

## 2021-09-23 LAB
AMPHET+METHAMPHET UR QL: POSITIVE
AMPHETAMINE INTERNAL CONTROL: ABNORMAL
AMPHETAMINES UR QL: NEGATIVE
BARBITURATE INTERNAL CONTROL: ABNORMAL
BARBITURATES UR QL SCN: NEGATIVE
BENZODIAZ UR QL SCN: NEGATIVE
BENZODIAZEPINE INTERNAL CONTROL: ABNORMAL
BUPRENORPHINE INTERNAL CONTROL: ABNORMAL
BUPRENORPHINE SERPL-MCNC: NEGATIVE NG/ML
CANNABINOIDS SERPL QL: NEGATIVE
COCAINE INTERNAL CONTROL: ABNORMAL
COCAINE UR QL: NEGATIVE
EXPIRATION DATE: ABNORMAL
Lab: ABNORMAL
MDMA (ECSTASY) INTERNAL CONTROL: ABNORMAL
MDMA UR QL SCN: NEGATIVE
METHADONE INTERNAL CONTROL: ABNORMAL
METHADONE UR QL SCN: NEGATIVE
METHAMPHETAMINE INTERNAL CONTROL: ABNORMAL
OPIATES INTERNAL CONTROL: ABNORMAL
OPIATES UR QL: NEGATIVE
OXYCODONE INTERNAL CONTROL: ABNORMAL
OXYCODONE UR QL SCN: NEGATIVE
PCP UR QL SCN: NEGATIVE
PHENCYCLIDINE INTERNAL CONTROL: ABNORMAL
THC INTERNAL CONTROL: ABNORMAL

## 2021-09-23 PROCEDURE — 99214 OFFICE O/P EST MOD 30 MIN: CPT | Performed by: PHYSICIAN ASSISTANT

## 2021-09-23 PROCEDURE — 80305 DRUG TEST PRSMV DIR OPT OBS: CPT | Performed by: PHYSICIAN ASSISTANT

## 2021-09-23 NOTE — PROGRESS NOTES
Chief Complaint  Anxiety (3 month follow up), Vitamin D Deficiency, and Insomnia    Subjective          Audra Daniel presents to [facility]  History of Present Illness  Pt presents today for 3 month follow up.    Pt tested positive for Covid Aug 8 @ [facility]; pt states she is doing well does still have some SOA and fatigue. Pt states she will have a little bit of brain fog at times, is getting better over time.    Labs 3/30/21  Pap neithers  andres 3/12/21  UDS 9/8/20-due     Covid August 8th, 2021      Current Outpatient Medications:   •  amphetamine-dextroamphetamine (ADDERALL) 20 MG tablet, Take 1 tablet by mouth 2 (Two) Times a Day., Disp: 60 tablet, Rfl: 0  •  aspirin 81 MG EC tablet, Take 81 mg by mouth Daily., Disp: , Rfl:   •  buPROPion SR (WELLBUTRIN SR) 150 MG 12 hr tablet, Take 1 tablet by mouth 2 (Two) Times a Day., Disp: 180 tablet, Rfl: 1  •  levothyroxine (SYNTHROID, LEVOTHROID) 75 MCG tablet, Take 1 tablet by mouth Daily., Disp: 90 tablet, Rfl: 1  •  liothyronine (CYTOMEL) 5 MCG tablet, Take 1 tablet by mouth Daily. (Patient taking differently: Take 5 mcg by mouth Daily. 1-2 daily), Disp: 90 tablet, Rfl: 1  •  LORazepam (ATIVAN) 0.5 MG tablet, Take 0.5 mg by mouth Every 8 (Eight) Hours As Needed for Anxiety., Disp: , Rfl:   •  metFORMIN (GLUCOPHAGE) 500 MG tablet, Take 1 tablet by mouth 2 (Two) Times a Day With Meals., Disp: 180 tablet, Rfl: 1  •  pantoprazole (PROTONIX) 40 MG EC tablet, TAKE 1 TABLET DAILY, Disp: 90 tablet, Rfl: 1  •  Plecanatide 3 MG tablet, Take 1 tablet by mouth Daily., Disp: 30 tablet, Rfl: 1  •  Semaglutide, 1 MG/DOSE, (OZEMPIC) 2 MG/1.5ML solution pen-injector, Inject 1 mg under the skin into the appropriate area as directed 1 (One) Time Per Week for 90 days., Disp: 6 pen, Rfl: 1  •  spironolactone (ALDACTONE) 100 MG tablet, Take 1 tablet by mouth Daily., Disp: 90 tablet, Rfl: 1  •  vitamin D (ERGOCALCIFEROL) 1.25 MG (74096 UT) capsule  "capsule, TAKE 1 CAPSULE EVERY 7 DAYS, Disp: 12 capsule, Rfl: 1  •  zolpidem (AMBIEN) 10 MG tablet, TAKE 1 TABLET(10 MG) BY MOUTH EVERY DAY AT BEDTIME, Disp: 90 tablet, Rfl: 1    Objective      Vital Signs:   /55 (BP Location: Left arm)   Pulse 96   Ht 162.6 cm (64\")   Wt 115 kg (252 lb 12.8 oz)   SpO2 98%   BMI 43.39 kg/m²    Estimated body mass index is 43.39 kg/m² as calculated from the following:    Height as of this encounter: 162.6 cm (64\").    Weight as of this encounter: 115 kg (252 lb 12.8 oz).     Physical Exam  Vitals and nursing note reviewed.   Constitutional:       Appearance: Normal appearance. She is obese.   HENT:      Head: Normocephalic and atraumatic.      Right Ear: Tympanic membrane, ear canal and external ear normal.      Left Ear: Tympanic membrane, ear canal and external ear normal.      Nose: Nose normal.      Mouth/Throat:      Mouth: Mucous membranes are moist.   Eyes:      Pupils: Pupils are equal, round, and reactive to light.   Cardiovascular:      Rate and Rhythm: Normal rate and regular rhythm.      Heart sounds: Normal heart sounds.   Pulmonary:      Effort: Pulmonary effort is normal.      Breath sounds: Normal breath sounds.   Musculoskeletal:         General: Normal range of motion.      Cervical back: Neck supple.   Skin:     General: Skin is warm.   Neurological:      General: No focal deficit present.      Mental Status: She is alert.   Psychiatric:         Mood and Affect: Mood normal.         Behavior: Behavior normal.         Thought Content: Thought content normal.         Judgment: Judgment normal.        Result Review :     Common labs    Common Labsle 12/11/20 3/30/21 8/23/21   Glucose 87 77    BUN 19 9    Creatinine 0.70 0.76    Sodium 137 134 (A)    Potassium 3.9 4.3    Chloride 104 103    Calcium 8.9 9.1    Albumin 4.2 4.2    Total Bilirubin 0.20 0.25    Alkaline Phosphatase 70 65    AST (SGOT) 29 16    ALT (SGPT) 38 13    WBC 7.29 6.43 8.93   Hemoglobin " 13.6 14.0 12.4   Hematocrit 41.4 42.3 37.7   Platelets 276 315 288   Total Cholesterol 190 172    Triglycerides 68 79    HDL Cholesterol 64 (A) 53    LDL Cholesterol  112 (A) 103 (A)    (A) Abnormal value       Comments are available for some flowsheets but are not being displayed.                       Assessment and Plan      Diagnoses and all orders for this visit:    1. Hypothyroidism, unspecified type (Primary)  Comments:  Stable on synthroid 75mcg daily, cytomel 5mcg  Overview:  lexvoxyl 75mcg QD well controlled      2. Class 3 severe obesity due to excess calories with serious comorbidity and body mass index (BMI) of 40.0 to 44.9 in adult (CMS/[facility])  Comments:  Disc diet and exercise  Overview:  Disc diet and exercise      3. Insomnia, unspecified type  Comments:  Stable ambien 10mg nightly  Overview:  Ambien 10mg QHS PRN well controlled      4. Gastroesophageal reflux disease with esophagitis without hemorrhage  Comments:  Stable with protonix 40mg daily  Overview:  Stable with protonix 40mg daily      5. Long term use of drug  -     POC Urine Drug Screen [facility]     Follow Up     Return in about 3 months (around 12/23/2021).    I have reviewed information obtained and documented by others and I have confirmed the accuracy of this documented note.     Patient was given instructions and counseling regarding her condition or for health maintenance advice. Please see specific information pulled into the AVS if appropriate.     DORY Griffiths

## 2021-09-24 RX ORDER — PLECANATIDE 3 MG/1
TABLET ORAL
Qty: 30 TABLET | Refills: 1 | Status: SHIPPED | OUTPATIENT
Start: 2021-09-24 | End: 2021-11-06 | Stop reason: SDUPTHER

## 2021-09-28 DIAGNOSIS — R06.02 SOB (SHORTNESS OF BREATH): ICD-10-CM

## 2021-09-28 DIAGNOSIS — F90.9 ATTENTION DEFICIT HYPERACTIVITY DISORDER (ADHD), UNSPECIFIED ADHD TYPE: ICD-10-CM

## 2021-09-28 DIAGNOSIS — R06.83 SNORING: Primary | ICD-10-CM

## 2021-09-30 RX ORDER — DEXTROAMPHETAMINE SACCHARATE, AMPHETAMINE ASPARTATE, DEXTROAMPHETAMINE SULFATE AND AMPHETAMINE SULFATE 5; 5; 5; 5 MG/1; MG/1; MG/1; MG/1
20 TABLET ORAL 2 TIMES DAILY
Qty: 60 TABLET | Refills: 0 | Status: SHIPPED | OUTPATIENT
Start: 2021-09-30 | End: 2021-11-12 | Stop reason: SDUPTHER

## 2021-09-30 NOTE — TELEPHONE ENCOUNTER
Pt requesting rf on Adderall 20mg 1 tab po bid #[id number] [phone]    Last fill- 9/2/21  OV-9/23/21  Juan Alberto-9/30/21  UDS-9/23/21

## 2021-10-20 ENCOUNTER — TELEPHONE (OUTPATIENT)
Dept: FAMILY MEDICINE CLINIC | Facility: CLINIC | Age: 41
End: 2021-10-20

## 2021-10-20 DIAGNOSIS — E03.9 HYPOTHYROIDISM, UNSPECIFIED TYPE: ICD-10-CM

## 2021-10-20 RX ORDER — LIOTHYRONINE SODIUM 5 UG/1
10 TABLET ORAL DAILY
Qty: 120 TABLET | Refills: 1 | Status: SHIPPED | OUTPATIENT
Start: 2021-10-20 | End: 2021-12-22 | Stop reason: SDUPTHER

## 2021-10-20 NOTE — TELEPHONE ENCOUNTER
----- Message from Audra Daniel sent at 10/19/2021  5:20 PM EDT -----  Regarding: Prescription Question  Contact: [phone]  Somehow, my prescription for Liothyronine has changed since I have switched to a mail order pharmacy. It has always been “5 mcg 1-2 times per day.”  I have always taken 2 per day. However, I am now only receiving 1 per day. I spoke with the mail order pharmacy, [facility], about this & they asked me to contact your office about correcting the prescription.   I receive my meds through them 60 days at a time. The current prescription they have is for 60 pills. Will you please change that to 120 pills for 60 days?  Also, I would like to transfer my Trulance to them as well. Will you send a 60 day prescription to them please?  Their contact information for [facility] is-  Phone- [phone]  Fax- [phone]    Thank you so much,  Audra Daniel

## 2021-10-25 ENCOUNTER — TELEPHONE (OUTPATIENT)
Dept: FAMILY MEDICINE CLINIC | Facility: CLINIC | Age: 41
End: 2021-10-25

## 2021-10-25 NOTE — TELEPHONE ENCOUNTER
Pharmacy Name: [facility] - [place], [place] - [address] [facility] - [phone] [facility] [phone]      Pharmacy representative name: JUANA    Pharmacy representative phone number: [phone]  What medication are you calling in regards to: liothyronine (CYTOMEL) 5 MCG tablet    What question does the pharmacy have: THE PHARMACY RECEIVED TWO DIFFERENT DIRECTIONS FOR THE PATIENT. SHE WOULD LIKE A CALL BACK TO VERIFY THE CORRECT DIRECTIONS    REFERENCE NUMBER: [id number]    Who is the provider that prescribed the medication: VALERIA TINSLEY

## 2021-11-01 ENCOUNTER — E-VISIT (OUTPATIENT)
Dept: FAMILY MEDICINE CLINIC | Facility: TELEHEALTH | Age: 41
End: 2021-11-01

## 2021-11-01 PROCEDURE — BRIGHTMDVISIT: Performed by: NURSE PRACTITIONER

## 2021-11-01 NOTE — EXTERNAL PATIENT INSTRUCTIONS
Note   Kareem Zhu, I am sorry so you are having so much anxiety and depression. I know things can be difficult in these times. Your primary care provider may be able to do a video visit with you to change your current medications. If you should ever have feelings of hurting yourself please proceed to the ER.   Diagnosis   Depression   My name is Adriana Ceja. I'm a healthcare provider at [facility]. I've reviewed your interview, and I see that you have some common symptoms of depression. I'm glad you reached out.   Depression is the most common mental health condition worldwide. In the [place], about 1 in 5 people will experience clinical depression in their lifetime. Fortunately, effective treatments are available. The sooner you start treatment, the better it works.   Treatment for depression can include counseling, coaching, consultations, antidepressant medications, or various digital tools. In creating your treatment plan, I've considered your symptoms, current situation, medical history, and previous treatments, if any.   In addition to your prescribed treatment, there are things you can do to help yourself feel better. Depression can lead you to avoid doing tasks, activities, and being with others. Taking action can help break the cycle of avoidance. Even small actions and lifestyle changes can make a big difference. Try some of the suggestions in the Other treatment section below.   Orders and referrals   Someone will contact you to set up an appointment and create a treatment plan that works for you.   About your diagnosis   Depression is different from ordinary sadness. When you're sad or going through normal grief, the feelings may come and go, and then fade over time. Depression causes long-standing symptoms that affect your ability to go about your daily life.   It's a health condition that affects how you think and function, and can even affect your self-esteem. Sometimes depression can also  cause physical symptoms such as headaches and stomach pains.   Common symptoms of depression include:    Feeling sad, hopeless, discouraged, or down    Loss of interest or pleasure in previously enjoyable activities    Appetite or weight changes    Sleep disturbances: sleeping too much or too little    Either restlessness or sluggishness    Loss of energy    Excessive guilt    Feelings of worthlessness    Difficulty concentrating    Recurrent thoughts of death or suicide   What to expect   Many people with mild symptoms of depression don't need medication, and can be treated with counseling, coaching, or other types of care management.   When to seek care   If you feel like harming yourself or others, call 911 right away.   The National Suicide Prevention Lifeline is also available. You can call it at 1-678.936.6508  . You can also access their online chat line  .   Call us at 1 [phone]   with any sudden or unexpected symptoms.    Worsening depression symptoms    New or worsening anxiety symptoms    Feeling extremely agitated or restless    Panic attacks    Worsening insomnia    New or worsening irritability    Inappropriate aggression, anger, or violence    Dangerous impulses    Extreme increase in activity or talking    Other unusual changes in behavior, mood, thoughts, or feeling   Other treatment   The tips below may help you feel better while you start your treatment plan:   Self-care    Depression can make self-care hard, but taking action can help you get better. So start where you are and set small goals. These can be simple: get out of bed, take a shower, get dressed, prepare a meal.    Make a list of activities that usually improve your mood. When you're feeling down, try doing one of those activities, even for a few minutes.    Be kind to yourself. Don't get down on yourself if you don't reach a goal. Be willing to try again.    Try to eat on a regular schedule. Blood sugar levels can affect  "mood.   Exercise    Physical exercise has an especially positive effect on mood. If you're able to, try walking 30 minutes a day, 3 to 5 times a week. If that sounds like too much, challenge yourself to start walking for just 10 minutes a day. If walking is not for you, find another activity. Any kind of physical activity helps. The best exercise is the kind you enjoy and will actually do.   Improve your sleep   Getting better sleep is one of the best things you can do to improve your symptoms.    Caffeine, tobacco, and alcohol can cause interrupted sleep. Cutting down or quitting these can improve the quality of your sleep. If you can't quit caffeine completely, try avoiding it later in the day.    Set a regular bedtime, and allow a period of time to \"unwind\" before going to sleep.    Wake up at the same time every day.    Turn off or put away all electronic devices an hour before going to sleep.    Avoid reading, watching TV, or using electronic devices in bed.    As much as possible, keep your bedroom dark, cool, and quiet.    If you're struggling to sleep, don't stay in bed. Get up and go to a quiet spot. Read or do relaxation exercises. Then go back to bed and try again.   Try mindfulness exercises    If your mind races, focus on your body instead. Breathe in slowly through your nose and out through your mouth.    Some people find that meditation helps with mood symptoms. If you want to try meditation but don't know how, mobile apps can get you started.   Use your creativity    When you have depression, you can often spend too much time thinking. Making something with your hands can use your thoughts in a positive way and bring some relief. It also helps you move from inaction to action. Activities like writing in a journal, gardening, woodworking, cooking, or doing a craft can help focus your mind.   Connect with others    If you can't meet in person, send a short text or email to someone just to keep in " touch.    If you use social media, notice how it makes you feel. If certain topics or people have a negative effect on your mood, unfollow them. Limit the time you spend on social media. Active participation can be better than passive scrolling through a feed.    If you're up to it, try volunteering. Or just do something kind for someone. This can lift your mood as well as theirs.   Your provider   Your diagnosis was provided by Adriana Ceja, a member of your trusted care team at [facility].   If you have any questions, call us at [phone]  .

## 2021-11-01 NOTE — E-VISIT TREATED
Chief Complaint: Anxiety, Depression, Stress   Patient introduction   Patient is 41-year-old female presenting with mood symptoms. Patient reports experiencing current symptoms for more than a year. Patient is willing to try medication as part of their treatment plan.   Patient-submitted comments explaining reason for visit: Increased anxiety, depression, overall irritability, tearfulness, and forgetfulness that has become more consuming than ever before. Possibly post-Covid symptoms. Hoping to receive care sooner than I can be seen in office..   Patient did not request an excuse note.   Reports recent unusual stress relating to family functioning, work, the COVID-19 pandemic, and current news and events.   Depression screening   PHQ-9. Response options are: Not at all (0), On several days (1), More than half the days (2), or Nearly every day (3).   Over the past 2 weeks, patient has been bothered:    (3) Nearly every day by having little interest or pleasure in doing things    (2) On more than half the days by depressed mood    (3) Nearly every day by sleep disturbance    (3) Nearly every day by fatigue or lethargy    (2) On more than half the days by change in appetite    (1) On several days by feelings of worthlessness or excessive guilt    (1) On several days by poor concentration    (0) Not at all by observable restlessness or slowness in movement    (0) Not at all by thoughts of hurting themselves or that they'd be better off dead   Reports that the above problems have made it very difficult to work, function at home, or get along with other people.   Score: 15. Interpretation: 0-4: None to minimal. 5-9: Mild depression. 10-14: Major Depressive Disorder, Mild. 15-19: Major Depressive Disorder, Moderately Severe. 20-27: Major Depressive Disorder, Severe.   Anxiety screening   SHIRA-7. Response options are: Not at all (0), On several days (1), More than half the days (2), or Nearly every day (3)   Over the past  2 weeks, patient has been bothered:    (3) Nearly every day by feeling nervous, anxious, or on edge    (3) Nearly every day by not being able to stop or control worrying    (3) Nearly every day by worrying too much about different things    (3) Nearly every day by having trouble relaxing    (0) Not at all by being so restless that it's hard to sit still    (3) Nearly every day by becoming easily annoyed or irritable    (0) Not at all by feeling afraid, as if something awful might happen   Reports that the above problems have made it very difficult to work, function at home, or get along with other people.   Score: 15. Interpretation: 0-4: None to minimal. 5-9: Mild anxiety. 10-14: Moderate anxiety. 15-21: Severe anxiety.   Suicide risk screening   Score: Negative screen (based on PHQ-9 responses above).   Action taken based on risk:    Negative screen: Patient completed interview.    Low risk: Patient completed interview. Follow-up per provider discretion.    Moderate risk: Recommended to call the [facility] [employer] [facility] Suicide Prevention Lifeline, [place], or go to their nearest ER. Patient given option to continue with the interview if those options are not relevant at this time. Follow-up per provider discretion.    High risk: Interview terminated. Recommended to go to ER, or call 911 or the National Suicide Prevention Lifeline.   Repetitive thoughts and behaviors screening   DSM-5 Level 1 Cross-Cutting Symptom Measure, Section X. 2 items. Response options are: Not at all (0), Rarely (1), Several days (2), More than half the days (3), or Nearly every day (4)   Over the past 2 weeks, patient has been bothered:    (0) Not at all by unpleasant thoughts, urges, or images that repeatedly enter their mind    (0) Not at all by feeling driven to repeat certain behaviors or mental acts   Score: 0. Interpretation: 0-2 (with 0-1 on both items): Negative screen. >= 2 (with >= 2 on either item): Positive screen.   [place]/hypomania screening    DSM-5 Level 1 Cross-Cutting Symptom Measure, Section III. 2 items. Response options are: Not at all (0), Rarely (1), Several days (2), More than half the days (3), or Nearly every day (4)   Over the past 2 weeks, patient has been bothered:    (0) Not at all by sleeping less than usual, but still having a lot of energy    (0) Not at all by starting lots more projects than usual or doing more risky things than usual   Score: 0. Interpretation: 0-2 (with <= 1 on both items): Negative screen. >= 2 (with >= 2 on at least 1 item): Positive screen; in-interview follow-up with [employer] Self-Rating [place] (ASRM) Scale.   Psychosis/hallucination screening   DSM-5 Level 1 Cross-Cutting Symptom Measure, Section VII. 2 items. Response options are: Not at all (0), Rarely (1), Several days (2), More than half the days (3), or Nearly every day (4)   Over the past 2 weeks, patient has been bothered:    (0) Not at all by hearing things other people couldn't hear    (0) Not at all by feeling that someone could hear their thoughts   Score: 0. Interpretation: 0: Negative screen. 1 or higher: Positive screen.   Substance abuse screening   DSM-5 Level 1 Cross-Cutting Symptom Measure, Section XIII. 3 items on use of alcohol, tobacco, recreational drugs, or prescription medications beyond the amount prescribed or duration of prescription.   Over the past 2 weeks, patient:    (0) Denies having at least 4 alcoholic drinks on any single day    (0) Denies using tobacco    (0) Denies using a recreational or prescription drug on their own   Score: 0. Interpretation: 0 is a negative screen. 1 or higher with positive response for prescription/recreational drug abuse leads to follow-up with Level 2 Cross-Cutting Symptom Measure, Section XIII. 1 or higher with positive response for alcohol leads to follow-up with AUDIT-C. 1 or higher with positive response for tobacco use leads to tobacco cessation advice in AVS.   Comorbid/Exacerbating conditions    Reports thyroid disorder.   Past mental health history   Reports previous diagnosis of depression and generalized anxiety disorder.   Family history of mental health disorders   Reports family history of depression, generalized anxiety disorder, panic attacks, bipolar disorder, and substance use disorder.   Current mental health treatment   Patient is not currently in counseling or therapy. Patient is not currently being seen by a psychiatrist and has not been seen by one in the last 2 years.   Reports currently taking bupropion. As to effectiveness of current treatment:    Patient is satisfied with bupropion. Patient does not want a refill of bupropion.   Previous mental health treatment   Reports they've taken duloxetine, fluoxetine, desvenlafaxine, and sertraline in the past.   As to effectiveness of past treatment:    Patient is satisfied with fluoxetine. Patient does not want a refill of fluoxetine.    Patient is not satisfied with sertraline. Reports it is of limited effectiveness.    Patient is not satisfied with desvenlafaxine. Reports it's not effective and they don't tolerate the side effects well. They deny experiencing nausea, sweating, headache, dizziness, or sexual side effects.    Patient is satisfied with duloxetine. Patient requests a refill of duloxetine.   Current medications   Reports taking pantoprazole 40 MG [Protonix], althiazide / spironolactone, metformin Oral Tablet [Orabet Metformin], Wellbutrin SR, Adderall, plecanatide Oral Tablet [Trulance], Ambien, lorazepam 0.5 MG, Ozempic, and ALOE/VITAMIN A/VITAMIN D/VITAMIN E.   Medication allergies    SNRIs   Medication contraindications   None.   Assessment   Major depressive disorder, single, moderate.   This diagnosis is based on review of patient interview responses and other available clinical information.    PHQ-9 depression screening score: 15. Interpretation: 15-19: Major Depressive Disorder, Moderately Severe.    SHIRA-7 generalized anxiety  screening score: 15. Interpretation: 15-21: Severe anxiety.   Suicide risk severity screening was negative.   Screening results show low likelihood of mayte/hypomania and psychosis.   Plan   Medications:   No medications prescribed.   Orders:    Referral to primary care physician.   Education:    Condition and causes    Treatment and self-care    Possible medication side effects    When to call provider      ----------   Electronically signed by MICHAEL Siegel on 2021-11-01 at 16:21PM   ----------   Patient Interview Transcript:   Have you recently experienced unusual stress from any of these? Select all that apply.    Family    Work    Something related to COVID-19    Current news and events   Not selected:    Personal relationships    Home situation    Finances    None of the above   Have you ever been diagnosed with any of these mental health conditions? Select all that apply.    Depression    Generalized anxiety disorder (SHIRA)   Not selected:    Panic attacks    Post traumatic stress disorder (PTSD)    Obsessive-compulsive disorder (OCD)    Bipolar disorder    Schizophrenia or schizoaffective disorder    A mental health condition not listed here (specify)    None of the above   Are you currently taking medication for any mental health condition? Select one.    Yes   Not selected:    No   Which of these medications are you currently taking for your mental health condition(s)? Select all that apply.    Wellbutrin or Aplenzin (bupropion)   Not selected:    Atarax or Vistaril (hydroxyzine)    BuSpar (buspirone)    Celexa (citalopram)    Cymbalta (duloxetine)    Effexor (venlafaxine)    Lexapro (escitalopram)    Norpramin (desipramine)    Pamelor (nortriptyline)    Paxil (paroxetine)    Pristiq (desvenlafaxine)    Prozac (fluoxetine)    Remeron (mirtazapine)    Trazodone    Zoloft (sertraline)    Other (specify medication and its effectiveness)   Have you taken any other medications for this or any other mental  health condition in the past? Select one.    Yes   Not selected:    No   Which medications have you taken in the past for your mental health condition(s)? Select all that apply.    Cymbalta (duloxetine)    Prozac (fluoxetine)    Pristiq (desvenlafaxine)    Zoloft (sertraline)   Not selected:    Atarax or Vistaril (hydroxyzine)    BuSpar (buspirone)    Celexa (citalopram)    Effexor (venlafaxine)    Lexapro (escitalopram)    Norpramin (desipramine)    Pamelor (nortriptyline)    Paxil (paroxetine)    Remeron (mirtazapine)    Trazodone    Wellbutrin or Aplenzin (bupropion)    Other (specify medication and its effectiveness)   Has Prozac (fluoxetine) worked well for you? Select one.    Yes   Not selected:    No   Would you like to refill or restart Prozac (fluoxetine) today? Select one.    No   Not selected:    Yes   Has Zoloft (sertraline) worked well for you? Select one.    No   Not selected:    Yes   Why were you unsatisfied with Zoloft (sertraline)? Select all that apply.    It helps some, but I still have bothersome symptoms   Not selected:    It doesn't help my symptoms at all    I don't like the side effects    It's too expensive    None of the above   Has Pristiq (desvenlafaxine) worked well for you? Select one.    No   Not selected:    Yes   Why were you unsatisfied with Pristiq (desvenlafaxine)? Select all that apply.    It doesn't help my symptoms at all    I don't like the side effects   Not selected:    It helps some, but I still have bothersome symptoms    It's too expensive    None of the above   Have you had any of these side effects when taking Pristiq (desvenlafaxine)? Select all that apply.    None of the above   Not selected:    Sexual side effects    Nausea    Sweating    Headache    Dizziness   Has Cymbalta (duloxetine) worked well for you? Select one.    Yes   Not selected:    No   Would you like to refill or restart Cymbalta (duloxetine) today? Select one.    Yes   Not selected:    No   Has  Wellbutrin or Aplenzin (bupropion) worked well for you? Select one.    Yes   Not selected:    No   Would you like to refill or restart Wellbutrin or Aplenzin (bupropion) today? Select one.    No   Not selected:    Yes   Are you currently in counseling or therapy? Select one.    No   Not selected:    Yes   Are you currently being seen by a psychiatrist, or have you been seen by a psychiatrist in the last 2 years? Select one.    No   Not selected:    Yes, currently    Yes, within the last 2 years   Do any of these apply to you? Select all that apply.    None of the above   Not selected:    I'm pregnant    I've given birth in the past 12 months    I'm breastfeeding   Do you have any of these medical conditions? Scroll to see all options. Select all that apply.    Thyroid disorder   Not selected:    Asthma    Cancer    Chronic pain    Congestive heart failure    Coronary artery disease (blocked arteries in the heart)    Diabetes    Epilepsy    High blood pressure    Inflammatory arthritis    Kidney disease or history of kidney function problems    Lupus (SLE)    Multiple sclerosis    Parkinson disease    Viral hepatitis    None of the above   Has anyone in your family had any of these? Select all that apply.    Depression    Generalized anxiety disorder (SHIRA)    Panic attacks    Bipolar disorder    Drug or alcohol addiction (substance use disorder)   Not selected:    Post traumatic stress disorder (PTSD)    Obsessive-compulsive disorder (OCD)    Schizophrenia or schizoaffective disorder     by suicide    Attempted suicide    No, not that I know of   1. Over the past 2 weeks, how often have you been bothered by: Having little interest or pleasure in doing things Select one.    Nearly every day   Not selected:    Not at all    Several days    More than half the days   2. Over the past 2 weeks, how often have you been bothered by: Feeling down, depressed, or hopeless Select one.    More than half the days   Not  selected:    Not at all    Several days    Nearly every day   3. Over the past 2 weeks, how often have you been bothered by: Trouble falling or staying asleep, or sleeping too much Select one.    Nearly every day   Not selected:    Not at all    Several days    More than half the days   4. Over the past 2 weeks, how often have you been bothered by: Feeling tired or having little energy Select one.    Nearly every day   Not selected:    Not at all    Several days    More than half the days   5. Over the past 2 weeks, how often have you been bothered by: Poor appetite or overeating Select one.    More than half the days   Not selected:    Not at all    Several days    Nearly every day   6. Over the past 2 weeks, how often have you been bothered by: Feeling bad about yourself, that you're a failure, or that you've let yourself or friends and family down Select one.    Several days   Not selected:    Not at all    More than half the days    Nearly every day   7. Over the past 2 weeks, how often have you been bothered by: Trouble concentrating on things like watching TV or reading the news Select one.    Several days   Not selected:    Not at all    More than half the days    Nearly every day   8. Over the past 2 weeks, how often have you been bothered by: Moving or speaking so slowly that other people could have noticed OR Being so fidgety or restless that you have been moving around a lot more than usual Select one.    Not at all   Not selected:    Several days    More than half the days    Nearly every day   9. Over the past 2 weeks, how often have you been bothered by: Thoughts that you'd be better off dead or thoughts of hurting yourself Select one.    Not at all   Not selected:    Several days    More than half the days    Nearly every day   How difficult have these problems made it for you to work, take care of things at home, or get along with other people? Select one.    Very difficult   Not selected:    Not  difficult at all    Somewhat difficult    Extremely difficult   1. Over the past 2 weeks, how often have you been bothered by: Feeling nervous, anxious, or on edge? Select one.    Nearly every day   Not selected:    Not at all    Several days    More than half the days   2. Over the past 2 weeks, how often have you been bothered by: Not being able to stop or control worrying? Select one.    Nearly every day   Not selected:    Not at all    Several days    More than half the days   3. Over the past 2 weeks, how often have you been bothered by: Worrying too much about different things? Select one.    Nearly every day   Not selected:    Not at all    Several days    More than half the days   4. Over the past 2 weeks, how often have you been bothered by: Having trouble relaxing? Select one.    Nearly every day   Not selected:    Not at all    Several days    More than half the days   5. Over the past 2 weeks, how often have you been bothered by: Being so restless that it's hard to sit still? Select one.    Not at all   Not selected:    Several days    More than half the days    Nearly every day   6. Over the past 2 weeks, how often have you been bothered by: Becoming easily annoyed or irritable? Select one.    Nearly every day   Not selected:    Not at all    Several days    More than half the days   7. Over the past 2 weeks, how often have you been bothered by: Feeling afraid, as if something awful might happen? Select one.    Not at all   Not selected:    Several days    More than half the days    Nearly every day   How difficult have these symptoms made it for you to do your work, take care of things at home, or get along with other people? Select one.    Very difficult   Not selected:    Not difficult at all    Somewhat difficult    Extremely difficult   Over the past 2 weeks, how often have you been bothered by: Sleeping less than usual, but still having a lot of energy? Select one.    Not at all   Not selected:     1 to 2 days    Several days    More than half the days    Nearly every day   Over the past 2 weeks, how often have you been bothered by: Starting lots more projects than usual or doing more risky things than usual? Select one.    Not at all   Not selected:    1 to 2 days    Several days    More than half the days    Nearly every day   Over the past 2 weeks, how often have you been bothered by: Hearing things other people couldn't hear, such as voices even when no one was around? Select one.    Not at all   Not selected:    1 to 2 days    Several days    More than half the days    Nearly every day   Over the past 2 weeks, how often have you been bothered by: Feeling that someone could hear your thoughts, or that you could hear what another person was thinking? Select one.    Not at all   Not selected:    1 to 2 days    Several days    More than half the days    Nearly every day   Over the past 2 weeks, how often have you been bothered by: Unpleasant thoughts, urges, or images that repeatedly enter your mind? Select one.    Not at all   Not selected:    1 to 2 days    Several days    More than half the days    Nearly every day   Over the past 2 weeks, how often have you been bothered by: Feeling driven to perform certain behaviors or mental acts over and over again? Select one.    Not at all   Not selected:    1 to 2 days    Several days    More than half the days    Nearly every day   Over the past 2 weeks, how often did you: Have at least 4 drinks of any kind of alcohol in a single day? Select one.    Not at all   Not selected:    1 to 2 days    Several days    More than half the days    Nearly every day   Over the past 2 weeks, how often have you: Smoked any cigarettes, smoked a cigar or pipe, or used snuff or chewing tobacco? Select one.    Not at all   Not selected:    1 to 2 days    Several days    More than half the days    Nearly every day   Over the past 2 weeks, how often did you use any of these medicines  "on your own? \"On your own\" means without a doctor's prescription, or more than prescribed, or longer than prescribed. - Prescription painkillers, such as Vicodin - Stimulants, such as Ritalin or Adderall - Sedatives or tranquilizers, such as sleeping pills or Valium - Marijuana - Cocaine or crack - Club drugs, such as Ecstasy - Hallucinogens, such as LSD - Heroin - Inhalants or solvents, such as glue - Methamphetamines, such as speed Select one.    Not at all   Not selected:    1 to 2 days    Several days    More than half the days    Nearly every day   Think about all of the symptoms you've shared with us today. How long have you been feeling this way? Select one.    More than a year   Not selected:    Less than a year    I'm not sure   These last few questions help us make sure your treatment plan is safe for you. Do you have any of these conditions? Select all that apply.    None of these   Not selected:    Uncorrected or persistent electrolyte abnormalities, such as potassium, sodium, calcium or magnesium    QT prolongation    Congenital long QT syndrome (LQTS)    Ventricular arrhythmias, such as ventricular fibrillation or ventricular tachycardia    Bradycardia (low heart rate)    Recent heart attack    Congestive heart failure (CHF)    Brugada syndrome   Do any of these apply to you now or in the recent past? \"Cold turkey\" here means stopping a medication suddenly rather than slowly taking lower and lower doses until you're off the medication. Select all that apply.    None of these   Not selected:    Seizure disorder    Bulimia or anorexia    Liver disease    Alcohol abuse    Stopped using alcohol \"cold turkey\"    Stopped using a sedative \"cold turkey\"    Stopped using an anti-seizure drug \"cold turkey\"    Stopped using a benzodiazepine drug (Klonopin, Valium, Ativan, Xanax) \"cold turkey\"   Are you currently taking any of these medications? Select all that apply.    None of these   Not selected:    MAO " inhibitor in the last 14 days    Linezolid or IV methylene blue    Pimozide    Thioridazine   Are you taking any other medications or supplements? On the next screen, you need to list all vitamins, supplements, non-prescription medications (such as aspirin or Aleve), and prescription medications that you're taking. Select one.    Yes   Not selected:    Yes, but I'm not sure what they are    No   Have you ever had an allergic or bad reaction to any medication? Select one.    Yes   Not selected:    No   Have you had an allergic or bad reaction to any of these medications? Select all that apply.    SNRIs, such as desvenlafaxine (Pristiq), duloxetine (Cymbalta), or venlafaxine (Effexor)   Not selected:    Bupropion (Wellbutrin, Aplenzin, Zyban)    Buspirone (BuSpar)    Hydroxyzine (Atarax, Vistaril), cetirizine (Zyrtec), or levocetirizine (Xyzal)    Mirtazapine (Remeron)    SSRIs, such as fluoxetine (Prozac), sertraline (Zoloft), citalopram (Celexa), escitalopram (Lexapro), or paroxetine (Paxil)    Trazodone    Tricyclic antidepressants, such as amitriptyline, desipramine (Norpramin), imipramine (Tofranil), or nortriptyline (Pamelor)    None of these   If medication is recommended as part of your treatment plan, is that something you're willing to try? Select one.    Yes   Not selected:    No   Do you need a doctor's note? A doctor's note confirms that you received care today and states when you can return to school or work. It does not contain information about your diagnosis or treatment plan. Your provider will make the final decision on whether to give you a doctor's note. Doctor's notes CANNOT be backdated. Select one.    No   Not selected:    Today only (1 day)    Today and tomorrow (2 days)    3 days   What is the main reason you're taking this interview today?    Increased anxiety, depression, overall irritability, tearfulness, and forgetfulness that has become more consuming than ever before. Possibly  post-Covid symptoms. Hoping to receive care sooner than I can be seen in office.   ----------   Medical history   The following information was received from the EMR on November 01, 2021.   Allergies:    CODEINE   - Allergy Type: medication   - Reaction: Anaphylaxis   - Severity: Severe   - Status: active   Medications:    LEVOTHYROXINE SODIUM 75 MCG PO TABS   - Route: Oral   - Start Date: July 14, 2021   - End Date: None   - Status: active    ASPIRIN EC 81 MG PO TBEC   - Route: Oral   - Start Date: June 22, 2021   - End Date: None   - Status: active    SEMAGLUTIDE (1 MG/DOSE) 2 MG/1.5ML SC SOPN   - Route: Subcutaneous   - Start Date: September 03, 2021   - End Date: None   - Status: active    SPIRONOLACTONE 100 MG PO TABS   - Route: Oral   - Start Date: July 14, 2021   - End Date: None   - Status: active    ZOLPIDEM TARTRATE 10 MG PO TABS   - Route:   - Start Date: July 06, 2021   - End Date: None   - Status: active    TRULANCE 3 MG PO TABS   - Route:   - Start Date: September 24, 2021   - End Date: None   - Status: active    PANTOPRAZOLE SODIUM 40 MG PO TBEC   - Route:   - Start Date: September 08, 2021   - End Date: None   - Status: active    VITAMIN D (ERGOCALCIFEROL) 1.25 MG (34997 UT) PO CAPS   - Route:   - Start Date: September 14, 2021   - End Date: None   - Status: active    BUPROPION HCL ER (SR) 150 MG PO TB12   - Route: Oral   - Start Date: July 14, 2021   - End Date: None   - Status: active    METFORMIN  MG PO TABS   - Route: Oral   - Start Date: July 14, 2021   - End Date: None   - Status: active    LIOTHYRONINE SODIUM 5 MCG PO TABS   - Route: Oral   - Start Date: October 20, 2021   - End Date: None   - Status: active    AMPHETAMINE-DEXTROAMPHETAMINE 20 MG PO TABS   - Route: Oral   - Start Date: September 30, 2021   - End Date: None   - Status: active    LORAZEPAM 0.5 MG PO TABS   - Route: Oral   - Start Date: June 22, 2021   - End Date: None   - Status: active   Problem list:    Anxiety  disorder   - Category: Problem List Item   - Health Status:   - Start Date: June 22, 2021   - End Date: None   - Status: active    Attention deficit hyperactivity disorder (ADHD)   - Category: Problem List Item   - Health Status:   - Start Date: June 22, 2021   - End Date: None   - Status: active    Breast implant rupture   - Category: Problem List Item   - Health Status:   - Start Date: June 22, 2021   - End Date: None   - Status: active    Major depressive disorder   - Category: Problem List Item   - Health Status:   - Start Date: June 22, 2021   - End Date: None   - Status: active    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)   - Category: Problem List Item   - Health Status:   - Start Date: June 22, 2021   - End Date: None   - Status: active    Hypothyroidism   - Category: Problem List Item   - Health Status:   - Start Date: June 22, 2021   - End Date: None   - Status: active    Insomnia   - Category: Problem List Item   - Health Status:   - Start Date: June 22, 2021   - End Date: None   - Status: active    Class 3 severe obesity due to excess calories with serious comorbidity and body mass index (BMI) of 40.0 to 44.9 in adult (HCC)   - Category: Problem List Item   - Health Status:   - Start Date: June 22, 2021   - End Date: None   - Status: active    Gastroesophageal reflux disease with esophagitis without hemorrhage   - Category: Problem List Item   - Health Status:   - Start Date: September 23, 2021   - End Date: None   - Status: active

## 2021-11-06 DIAGNOSIS — G47.00 INSOMNIA, UNSPECIFIED TYPE: ICD-10-CM

## 2021-11-06 DIAGNOSIS — K58.9 IRRITABLE BOWEL SYNDROME, UNSPECIFIED TYPE: ICD-10-CM

## 2021-11-06 DIAGNOSIS — F90.9 ATTENTION DEFICIT HYPERACTIVITY DISORDER (ADHD), UNSPECIFIED ADHD TYPE: ICD-10-CM

## 2021-11-08 DIAGNOSIS — K58.9 IRRITABLE BOWEL SYNDROME, UNSPECIFIED TYPE: ICD-10-CM

## 2021-11-08 RX ORDER — ZOLPIDEM TARTRATE 10 MG/1
10 TABLET ORAL NIGHTLY PRN
Qty: 90 TABLET | Refills: 1 | OUTPATIENT
Start: 2021-11-08

## 2021-11-08 RX ORDER — PLECANATIDE 3 MG/1
1 TABLET ORAL DAILY
Qty: 90 TABLET | Refills: 1 | Status: SHIPPED | OUTPATIENT
Start: 2021-11-08 | End: 2022-03-29 | Stop reason: SDUPTHER

## 2021-11-08 RX ORDER — PLECANATIDE 3 MG/1
1 TABLET ORAL DAILY
Qty: 90 TABLET | Refills: 1 | Status: SHIPPED | OUTPATIENT
Start: 2021-11-08 | End: 2021-11-08 | Stop reason: SDUPTHER

## 2021-11-09 RX ORDER — DEXTROAMPHETAMINE SACCHARATE, AMPHETAMINE ASPARTATE, DEXTROAMPHETAMINE SULFATE AND AMPHETAMINE SULFATE 5; 5; 5; 5 MG/1; MG/1; MG/1; MG/1
20 TABLET ORAL 2 TIMES DAILY
Qty: 60 TABLET | Refills: 0 | OUTPATIENT
Start: 2021-11-09

## 2021-11-12 DIAGNOSIS — F90.9 ATTENTION DEFICIT HYPERACTIVITY DISORDER (ADHD), UNSPECIFIED ADHD TYPE: ICD-10-CM

## 2021-11-12 NOTE — TELEPHONE ENCOUNTER
----- Message from Audra Daniel sent at 11/12/2021 11:23 AM EST -----  Regarding: Medication   Hi,   I submitted an online refill request for my adderall, but for some reason it says I need to “contact my provider.”  Will you please check on this for me?  Thank you!

## 2021-11-12 NOTE — TELEPHONE ENCOUNTER
Pt requesting rf on Adderall 20mg 1 tab po bid #[id number] [phone]    Last fill-9/30/21  OV-9/23/21  Juan Alberto-10/1/21  UDS-9/23/21

## 2021-11-15 RX ORDER — DEXTROAMPHETAMINE SACCHARATE, AMPHETAMINE ASPARTATE, DEXTROAMPHETAMINE SULFATE AND AMPHETAMINE SULFATE 5; 5; 5; 5 MG/1; MG/1; MG/1; MG/1
20 TABLET ORAL 2 TIMES DAILY
Qty: 60 TABLET | Refills: 0 | Status: SHIPPED | OUTPATIENT
Start: 2021-11-15 | End: 2021-12-27 | Stop reason: SDUPTHER

## 2021-11-16 ENCOUNTER — LAB (OUTPATIENT)
Dept: LAB | Facility: HOSPITAL | Age: 41
End: 2021-11-16

## 2021-11-16 ENCOUNTER — OFFICE VISIT (OUTPATIENT)
Dept: FAMILY MEDICINE CLINIC | Facility: CLINIC | Age: 41
End: 2021-11-16

## 2021-11-16 VITALS
DIASTOLIC BLOOD PRESSURE: 48 MMHG | WEIGHT: 255.8 LBS | OXYGEN SATURATION: 100 % | HEART RATE: 109 BPM | HEIGHT: 64 IN | BODY MASS INDEX: 43.67 KG/M2 | SYSTOLIC BLOOD PRESSURE: 119 MMHG

## 2021-11-16 DIAGNOSIS — F41.9 ANXIETY: ICD-10-CM

## 2021-11-16 DIAGNOSIS — K21.00 GASTROESOPHAGEAL REFLUX DISEASE WITH ESOPHAGITIS WITHOUT HEMORRHAGE: ICD-10-CM

## 2021-11-16 DIAGNOSIS — U07.1 COVID-19 VIRUS INFECTION: ICD-10-CM

## 2021-11-16 DIAGNOSIS — R06.83 SNORING: ICD-10-CM

## 2021-11-16 DIAGNOSIS — Z11.59 ENCOUNTER FOR HEPATITIS C SCREENING TEST FOR LOW RISK PATIENT: ICD-10-CM

## 2021-11-16 DIAGNOSIS — E53.8 FOLIC ACID DEFICIENCY: ICD-10-CM

## 2021-11-16 DIAGNOSIS — E03.9 HYPOTHYROIDISM, UNSPECIFIED TYPE: Chronic | ICD-10-CM

## 2021-11-16 DIAGNOSIS — E66.01 CLASS 3 SEVERE OBESITY DUE TO EXCESS CALORIES WITH SERIOUS COMORBIDITY AND BODY MASS INDEX (BMI) OF 40.0 TO 44.9 IN ADULT (HCC): Chronic | ICD-10-CM

## 2021-11-16 DIAGNOSIS — E53.8 FOLIC ACID DEFICIENCY: Primary | ICD-10-CM

## 2021-11-16 LAB
25(OH)D3 SERPL-MCNC: 40.7 NG/ML (ref 30–100)
ALBUMIN SERPL-MCNC: 4.6 G/DL (ref 3.5–5.2)
ALBUMIN/GLOB SERPL: 1.5 G/DL
ALP SERPL-CCNC: 76 U/L (ref 39–117)
ALT SERPL W P-5'-P-CCNC: 8 U/L (ref 1–33)
ANION GAP SERPL CALCULATED.3IONS-SCNC: 7.4 MMOL/L (ref 5–15)
AST SERPL-CCNC: 8 U/L (ref 1–32)
BASOPHILS # BLD AUTO: 0.06 10*3/MM3 (ref 0–0.2)
BASOPHILS NFR BLD AUTO: 0.6 % (ref 0–1.5)
BILIRUB SERPL-MCNC: <0.2 MG/DL (ref 0–1.2)
BUN SERPL-MCNC: 9 MG/DL (ref 6–20)
BUN/CREAT SERPL: 14.1 (ref 7–25)
CALCIUM SPEC-SCNC: 9.8 MG/DL (ref 8.6–10.5)
CHLORIDE SERPL-SCNC: 102 MMOL/L (ref 98–107)
CO2 SERPL-SCNC: 25.6 MMOL/L (ref 22–29)
CREAT SERPL-MCNC: 0.64 MG/DL (ref 0.57–1)
DEPRECATED RDW RBC AUTO: 45.5 FL (ref 37–54)
EOSINOPHIL # BLD AUTO: 0.27 10*3/MM3 (ref 0–0.4)
EOSINOPHIL NFR BLD AUTO: 2.8 % (ref 0.3–6.2)
ERYTHROCYTE [DISTWIDTH] IN BLOOD BY AUTOMATED COUNT: 13.4 % (ref 12.3–15.4)
FSH SERPL-ACNC: 2.11 MIU/ML
GFR SERPL CREATININE-BSD FRML MDRD: 102 ML/MIN/1.73
GLOBULIN UR ELPH-MCNC: 3 GM/DL
GLUCOSE SERPL-MCNC: 74 MG/DL (ref 65–99)
HCT VFR BLD AUTO: 39.8 % (ref 34–46.6)
HCV AB SER DONR QL: NORMAL
HGB BLD-MCNC: 13.4 G/DL (ref 12–15.9)
IMM GRANULOCYTES # BLD AUTO: 0.04 10*3/MM3 (ref 0–0.05)
IMM GRANULOCYTES NFR BLD AUTO: 0.4 % (ref 0–0.5)
LH SERPL-ACNC: 4.55 MIU/ML
LYMPHOCYTES # BLD AUTO: 2.55 10*3/MM3 (ref 0.7–3.1)
LYMPHOCYTES NFR BLD AUTO: 26.4 % (ref 19.6–45.3)
MCH RBC QN AUTO: 31.2 PG (ref 26.6–33)
MCHC RBC AUTO-ENTMCNC: 33.7 G/DL (ref 31.5–35.7)
MCV RBC AUTO: 92.8 FL (ref 79–97)
MONOCYTES # BLD AUTO: 0.84 10*3/MM3 (ref 0.1–0.9)
MONOCYTES NFR BLD AUTO: 8.7 % (ref 5–12)
NEUTROPHILS NFR BLD AUTO: 5.9 10*3/MM3 (ref 1.7–7)
NEUTROPHILS NFR BLD AUTO: 61.1 % (ref 42.7–76)
NRBC BLD AUTO-RTO: 0 /100 WBC (ref 0–0.2)
PLATELET # BLD AUTO: 347 10*3/MM3 (ref 140–450)
PMV BLD AUTO: 9.8 FL (ref 6–12)
POTASSIUM SERPL-SCNC: 4 MMOL/L (ref 3.5–5.2)
PROT SERPL-MCNC: 7.6 G/DL (ref 6–8.5)
RBC # BLD AUTO: 4.29 10*6/MM3 (ref 3.77–5.28)
SODIUM SERPL-SCNC: 135 MMOL/L (ref 136–145)
T4 FREE SERPL-MCNC: 0.93 NG/DL (ref 0.93–1.7)
TSH SERPL DL<=0.05 MIU/L-ACNC: 1.78 UIU/ML (ref 0.27–4.2)
WBC # BLD AUTO: 9.66 10*3/MM3 (ref 3.4–10.8)

## 2021-11-16 PROCEDURE — 36415 COLL VENOUS BLD VENIPUNCTURE: CPT

## 2021-11-16 PROCEDURE — 82672 ASSAY OF ESTROGEN: CPT

## 2021-11-16 PROCEDURE — 82306 VITAMIN D 25 HYDROXY: CPT

## 2021-11-16 PROCEDURE — 99214 OFFICE O/P EST MOD 30 MIN: CPT | Performed by: PHYSICIAN ASSISTANT

## 2021-11-16 PROCEDURE — 80053 COMPREHEN METABOLIC PANEL: CPT

## 2021-11-16 PROCEDURE — 86677 HELICOBACTER PYLORI ANTIBODY: CPT

## 2021-11-16 PROCEDURE — 84439 ASSAY OF FREE THYROXINE: CPT | Performed by: PHYSICIAN ASSISTANT

## 2021-11-16 PROCEDURE — 85025 COMPLETE CBC W/AUTO DIFF WBC: CPT

## 2021-11-16 PROCEDURE — 86803 HEPATITIS C AB TEST: CPT

## 2021-11-16 PROCEDURE — 84443 ASSAY THYROID STIM HORMONE: CPT | Performed by: PHYSICIAN ASSISTANT

## 2021-11-16 PROCEDURE — 83001 ASSAY OF GONADOTROPIN (FSH): CPT

## 2021-11-16 PROCEDURE — 83002 ASSAY OF GONADOTROPIN (LH): CPT

## 2021-11-16 RX ORDER — FAMOTIDINE 40 MG/1
40 TABLET, FILM COATED ORAL NIGHTLY PRN
COMMUNITY
End: 2021-12-27

## 2021-11-16 RX ORDER — FOLIC ACID 5 MG
5 CAPSULE ORAL DAILY
Qty: 60 EACH | Refills: 6 | Status: SHIPPED | OUTPATIENT
Start: 2021-11-16 | End: 2021-12-27 | Stop reason: SDUPTHER

## 2021-11-16 RX ORDER — SEMAGLUTIDE 1.7 MG/.75ML
1.7 INJECTION, SOLUTION SUBCUTANEOUS WEEKLY
Qty: 4 ML | Refills: 0 | Status: SHIPPED | OUTPATIENT
Start: 2021-11-16 | End: 2021-12-27

## 2021-11-16 RX ORDER — VILAZODONE HYDROCHLORIDE 10 MG-20MG
10 KIT ORAL DAILY
Qty: 1 KIT | Refills: 0 | Status: SHIPPED | OUTPATIENT
Start: 2021-11-16 | End: 2021-12-27

## 2021-11-16 NOTE — PROGRESS NOTES
Chief Complaint  Post COVID-19, Anxiety, and Depression    Subjective          Audra Daniel presents to [facility]  History of Present Illness    Audra Daniel is a 41 y.o. female who presents today for a follow up- post COVID-19, anxiety, depression    Pt notes she tested positive for COVID-19 8/8/21 at [facility]. Pt stated for the past month she has had increased fatigue, brain fog, anxiety and depression. Pt stated it is like a switch 'flipped', she is either crying or angry. Pt currently takes Wellbutrin 150mg twice a day and Ativan 0.5mg prn. She stated she is taking the Ativan more frequently than she ever has had to in the past.     Labs-3/2021    Pt states that over the past for weeks she has been pissed off or sad.  Pt is feeling mad at the world now.  Then she feels guilty.  She is crying lots of time.  Her  feels like she is losing her mind.      Current Outpatient Medications:   •  amphetamine-dextroamphetamine (ADDERALL) 20 MG tablet, Take 1 tablet by mouth 2 (Two) Times a Day., Disp: 60 tablet, Rfl: 0  •  aspirin 81 MG EC tablet, Take 81 mg by mouth Daily., Disp: , Rfl:   •  buPROPion SR (WELLBUTRIN SR) 150 MG 12 hr tablet, Take 1 tablet by mouth 2 (Two) Times a Day., Disp: 180 tablet, Rfl: 1  •  famotidine (PEPCID) 40 MG tablet, Take 40 mg by mouth At Night As Needed for Heartburn., Disp: , Rfl:   •  levothyroxine (SYNTHROID, LEVOTHROID) 75 MCG tablet, Take 1 tablet by mouth Daily., Disp: 90 tablet, Rfl: 1  •  liothyronine (CYTOMEL) 5 MCG tablet, Take 2 tablets by mouth Daily. 1-2 daily, Disp: 120 tablet, Rfl: 1  •  LORazepam (ATIVAN) 0.5 MG tablet, Take 0.5 mg by mouth Every 8 (Eight) Hours As Needed for Anxiety., Disp: , Rfl:   •  metFORMIN (GLUCOPHAGE) 500 MG tablet, Take 1 tablet by mouth 2 (Two) Times a Day With Meals., Disp: 180 tablet, Rfl: 1  •  pantoprazole (PROTONIX) 40 MG EC tablet, TAKE 1 TABLET DAILY, Disp: 90 tablet, Rfl: 1  •   "Plecanatide (Trulance) 3 MG tablet, Take 1 tablet by mouth Daily., Disp: 90 tablet, Rfl: 1  •  spironolactone (ALDACTONE) 100 MG tablet, Take 1 tablet by mouth Daily., Disp: 90 tablet, Rfl: 1  •  vitamin D (ERGOCALCIFEROL) 1.25 MG (42617 UT) capsule capsule, TAKE 1 CAPSULE EVERY 7 DAYS, Disp: 12 capsule, Rfl: 1  •  zolpidem (AMBIEN) 10 MG tablet, TAKE 1 TABLET(10 MG) BY MOUTH EVERY DAY AT BEDTIME, Disp: 90 tablet, Rfl: 1  •  Folic Acid 5 MG capsule, Take 5 mg by mouth Daily., Disp: 60 each, Rfl: 6  •  Semaglutide-Weight Management (Wegovy) 1.7 MG/0.75ML solution auto-injector, Inject 0.75 mL under the skin into the appropriate area as directed 1 (One) Time Per Week., Disp: 4 mL, Rfl: 0  •  Vilazodone HCl (Viibryd Starter Pack) 10 & 20 MG kit, Take 10 mg by mouth Daily for 30 days., Disp: 1 kit, Rfl: 0    Objective      Vital Signs:   /48 (BP Location: Left arm)   Pulse 109   Ht 162.6 cm (64\")   Wt 116 kg (255 lb 12.8 oz)   SpO2 100%   BMI 43.91 kg/m²    Estimated body mass index is 43.91 kg/m² as calculated from the following:    Height as of this encounter: 162.6 cm (64\").    Weight as of this encounter: 116 kg (255 lb 12.8 oz).     Physical Exam  Vitals and nursing note reviewed.   Constitutional:       Appearance: Normal appearance. She is obese.   HENT:      Head: Normocephalic and atraumatic.   Cardiovascular:      Rate and Rhythm: Normal rate and regular rhythm.   Pulmonary:      Effort: Pulmonary effort is normal.      Breath sounds: Normal breath sounds.   Musculoskeletal:      Cervical back: Neck supple.   Neurological:      Mental Status: She is alert.   Psychiatric:         Mood and Affect: Mood normal.         Behavior: Behavior normal.        Result Review :     Common labs    Common Labsle 12/11/20 3/30/21 8/23/21   Glucose 87 77    BUN 19 9    Creatinine 0.70 0.76    Sodium 137 134 (A)    Potassium 3.9 4.3    Chloride 104 103    Calcium 8.9 9.1    Albumin 4.2 4.2    Total Bilirubin 0.20 " 0.25    Alkaline Phosphatase 70 65    AST (SGOT) 29 16    ALT (SGPT) 38 13    WBC 7.29 6.43 8.93   Hemoglobin 13.6 14.0 12.4   Hematocrit 41.4 42.3 37.7   Platelets 276 315 288   Total Cholesterol 190 172    Triglycerides 68 79    HDL Cholesterol 64 (A) 53    LDL Cholesterol  112 (A) 103 (A)    (A) Abnormal value       Comments are available for some flowsheets but are not being displayed.                          Assessment and Plan      Diagnoses and all orders for this visit:    1. Folic acid deficiency (Primary)  -     CBC w AUTO Differential; Future  -     Comprehensive metabolic panel; Future  -     Folic Acid 5 MG capsule; Take 5 mg by mouth Daily.  Dispense: 60 each; Refill: 6    2. Class 3 severe obesity due to excess calories with serious comorbidity and body mass index (BMI) of 40.0 to 44.9 in adult (HCC)  Comments:  Disc diet and exercise  Overview:  Disc diet and exercise    Orders:  -     Semaglutide-Weight Management (Wegovy) 1.7 MG/0.75ML solution auto-injector; Inject 0.75 mL under the skin into the appropriate area as directed 1 (One) Time Per Week.  Dispense: 4 mL; Refill: 0    3. Anxiety  -     Estrogens, Total; Future  -     Follicle Stimulating Hormone; Future  -     Luteinizing Hormone; Future  -     TSH  -     T4, Free  -     CBC w AUTO Differential; Future  -     Comprehensive metabolic panel; Future  -     Vilazodone HCl (Viibryd Starter Pack) 10 & 20 MG kit; Take 10 mg by mouth Daily for 30 days.  Dispense: 1 kit; Refill: 0    4. Snoring  -     Estrogens, Total; Future  -     Follicle Stimulating Hormone; Future  -     Luteinizing Hormone; Future  -     TSH  -     T4, Free  -     CBC w AUTO Differential; Future  -     Comprehensive metabolic panel; Future  -     Vitamin D 25 hydroxy; Future  -     Overnight Sleep Oximetry Study; Future    5. COVID-19 virus infection  -     CBC w AUTO Differential; Future  -     Comprehensive metabolic panel; Future  -     Overnight Sleep Oximetry Study;  Future    6. Encounter for hepatitis C screening test for low risk patient  -     Hepatitis C antibody; Future    7. Gastroesophageal reflux disease with esophagitis without hemorrhage  Overview:  Stable with protonix 40mg daily    Orders:  -     Helicobacter Pylori, IgA IgG IgM; Future    8. Hypothyroidism, unspecified type  Comments:  Stable on synthroid 75mcg daily cytomel 5 mcg  Overview:  lexvoxyl 75mcg QD well controlled       Follow Up     Return in about 3 months (around 2/16/2022).    I have reviewed information obtained and documented by others and I have confirmed the accuracy of this documented note.     Patient was given instructions and counseling regarding her condition or for health maintenance advice. Please see specific information pulled into the AVS if appropriate.     DORY Griffiths

## 2021-11-17 ENCOUNTER — TELEPHONE (OUTPATIENT)
Dept: FAMILY MEDICINE CLINIC | Facility: CLINIC | Age: 41
End: 2021-11-17

## 2021-11-17 DIAGNOSIS — E03.9 HYPOTHYROIDISM, UNSPECIFIED TYPE: ICD-10-CM

## 2021-11-17 RX ORDER — LEVOTHYROXINE SODIUM 88 UG/1
88 TABLET ORAL DAILY
Qty: 90 TABLET | Refills: 1 | Status: SHIPPED | OUTPATIENT
Start: 2021-11-17 | End: 2021-12-27 | Stop reason: SDUPTHER

## 2021-11-17 NOTE — TELEPHONE ENCOUNTER
----- Message from DORY Griffiths sent at 11/17/2021 12:18 PM EST -----  Low Normal T4 - I recommend we increase synthroid from 75 to 88mcg daily #[id number].

## 2021-11-18 ENCOUNTER — TELEPHONE (OUTPATIENT)
Dept: FAMILY MEDICINE CLINIC | Facility: CLINIC | Age: 41
End: 2021-11-18

## 2021-11-18 DIAGNOSIS — A04.8 POSITIVE H. PYLORI TEST: Primary | ICD-10-CM

## 2021-11-18 LAB
H PYLORI IGA SER-ACNC: <9 UNITS (ref 0–8.9)
H PYLORI IGG SER IA-ACNC: 0.25 INDEX VALUE (ref 0–0.79)
H PYLORI IGM SER-ACNC: 14.2 UNITS (ref 0–8.9)

## 2021-11-18 RX ORDER — LANSOPRAZOLE, AMOXICILLIN, CLARITHROMYCIN 30-500-500
KIT ORAL 2 TIMES DAILY
Qty: 1 KIT | Refills: 0 | Status: SHIPPED | OUTPATIENT
Start: 2021-11-18 | End: 2021-12-27

## 2021-11-18 NOTE — TELEPHONE ENCOUNTER
----- Message from DORY Griffiths sent at 11/18/2021  3:09 PM EST -----  Positive H Pylori IGM - begin prevpac

## 2021-11-19 LAB — ESTROGEN SERPL-MCNC: 824 PG/ML

## 2021-11-22 ENCOUNTER — TELEPHONE (OUTPATIENT)
Dept: FAMILY MEDICINE CLINIC | Facility: CLINIC | Age: 41
End: 2021-11-22

## 2021-12-02 ENCOUNTER — TELEPHONE (OUTPATIENT)
Dept: FAMILY MEDICINE CLINIC | Facility: CLINIC | Age: 41
End: 2021-12-02

## 2021-12-22 ENCOUNTER — TELEPHONE (OUTPATIENT)
Dept: FAMILY MEDICINE CLINIC | Facility: CLINIC | Age: 41
End: 2021-12-22

## 2021-12-22 DIAGNOSIS — E03.9 HYPOTHYROIDISM, UNSPECIFIED TYPE: ICD-10-CM

## 2021-12-22 PROBLEM — F32.1 MAJOR DEPRESSIVE DISORDER, SINGLE EPISODE, MODERATE: Status: ACTIVE | Noted: 2021-12-22

## 2021-12-22 RX ORDER — LIOTHYRONINE SODIUM 5 UG/1
10 TABLET ORAL DAILY
Qty: 60 TABLET | Refills: 0 | Status: SHIPPED | OUTPATIENT
Start: 2021-12-22 | End: 2021-12-22 | Stop reason: SDUPTHER

## 2021-12-22 RX ORDER — LIOTHYRONINE SODIUM 5 UG/1
10 TABLET ORAL DAILY
Qty: 180 TABLET | Refills: 1 | Status: SHIPPED | OUTPATIENT
Start: 2021-12-22 | End: 2022-01-03

## 2021-12-22 NOTE — TELEPHONE ENCOUNTER
----- Message from Audra Daniel sent at 12/21/2021  8:06 PM EST -----  Regarding: medication  Hi,   I’m having trouble getting my Liothyronine. [facility] is saying the prescription was cancelled & [facility] says they never got the new prescription. I am completely out of medicine, so I wanted to ask if it would be possible to have one month (60 pills) sent to [facility] and then the 2-month prescription with refills sent to [facility] so that I am able to get back on it asap?  Thank you!

## 2021-12-27 ENCOUNTER — OFFICE VISIT (OUTPATIENT)
Dept: FAMILY MEDICINE CLINIC | Facility: CLINIC | Age: 41
End: 2021-12-27

## 2021-12-27 VITALS
OXYGEN SATURATION: 97 % | HEART RATE: 101 BPM | WEIGHT: 246.8 LBS | SYSTOLIC BLOOD PRESSURE: 117 MMHG | BODY MASS INDEX: 42.14 KG/M2 | HEIGHT: 64 IN | DIASTOLIC BLOOD PRESSURE: 86 MMHG

## 2021-12-27 DIAGNOSIS — E53.8 FOLIC ACID DEFICIENCY: ICD-10-CM

## 2021-12-27 DIAGNOSIS — F32.A DEPRESSION, UNSPECIFIED DEPRESSION TYPE: ICD-10-CM

## 2021-12-27 DIAGNOSIS — G47.00 INSOMNIA, UNSPECIFIED TYPE: ICD-10-CM

## 2021-12-27 DIAGNOSIS — F41.9 ANXIETY: Primary | ICD-10-CM

## 2021-12-27 DIAGNOSIS — F90.9 ATTENTION DEFICIT HYPERACTIVITY DISORDER (ADHD), UNSPECIFIED ADHD TYPE: ICD-10-CM

## 2021-12-27 DIAGNOSIS — E66.01 CLASS 3 SEVERE OBESITY DUE TO EXCESS CALORIES WITH SERIOUS COMORBIDITY AND BODY MASS INDEX (BMI) OF 40.0 TO 44.9 IN ADULT (HCC): ICD-10-CM

## 2021-12-27 DIAGNOSIS — E03.9 HYPOTHYROIDISM, UNSPECIFIED TYPE: ICD-10-CM

## 2021-12-27 PROCEDURE — 99214 OFFICE O/P EST MOD 30 MIN: CPT | Performed by: PHYSICIAN ASSISTANT

## 2021-12-27 RX ORDER — ZOLPIDEM TARTRATE 10 MG/1
10 TABLET ORAL NIGHTLY PRN
Qty: 60 TABLET | Refills: 1 | Status: SHIPPED | OUTPATIENT
Start: 2021-12-27 | End: 2022-04-25 | Stop reason: SDUPTHER

## 2021-12-27 RX ORDER — SEMAGLUTIDE 2.4 MG/.75ML
2.4 INJECTION, SOLUTION SUBCUTANEOUS WEEKLY
Qty: 8 ML | Refills: 6 | Status: SHIPPED | OUTPATIENT
Start: 2021-12-27 | End: 2022-01-05 | Stop reason: SDUPTHER

## 2021-12-27 RX ORDER — DEXTROAMPHETAMINE SACCHARATE, AMPHETAMINE ASPARTATE, DEXTROAMPHETAMINE SULFATE AND AMPHETAMINE SULFATE 5; 5; 5; 5 MG/1; MG/1; MG/1; MG/1
20 TABLET ORAL 2 TIMES DAILY
Qty: 120 TABLET | Refills: 0 | Status: SHIPPED | OUTPATIENT
Start: 2021-12-27 | End: 2022-03-10 | Stop reason: SDUPTHER

## 2021-12-27 RX ORDER — VORTIOXETINE 10 MG/1
10 TABLET, FILM COATED ORAL DAILY
Qty: 60 TABLET | Refills: 0 | Status: SHIPPED | OUTPATIENT
Start: 2021-12-27 | End: 2022-03-10

## 2021-12-27 RX ORDER — LEVOTHYROXINE SODIUM 88 UG/1
88 TABLET ORAL DAILY
Qty: 60 TABLET | Refills: 6 | Status: SHIPPED | OUTPATIENT
Start: 2021-12-27

## 2021-12-27 RX ORDER — FOLIC ACID 5 MG
5 CAPSULE ORAL DAILY
Qty: 60 EACH | Refills: 6 | Status: SHIPPED | OUTPATIENT
Start: 2021-12-27 | End: 2022-04-25 | Stop reason: SDUPTHER

## 2021-12-27 NOTE — PROGRESS NOTES
Chief Complaint  ADHD (3 month follow up), Anxiety, Depression, and Hypothyroidism    Subjective          Audra Daniel presents to [facility]  History of Present Illness  Audra Daniel is a 41 y.o. female who presents today for a 3 month follow up ADHD, Anxiety, Depression, Hypothyroidism    Pt offers no complaints at this time.     Labs-11/2021  UDS-9/23/21  Juan Alberto-10/1/21  Mammo-8/2020  Colonoscopy-4/2019    Past Medical History:   Diagnosis Date   • ADD (attention deficit disorder) 10/31/2017   • Anxiety disorder 11/17/2016   • Breast implant rupture 2016   • Depression    • Hypothyroidism 08/15/2019   • Insomnia 02/17/2017   • Major depressive disorder 11/17/2016   • MTHFR mutation 06/28/20016   • Obesity 11/17/2016   • Pap smear for cervical cancer screening 08/2020    dr. [id number]   • Pedal edema 03/10/2016      Family History   Problem Relation Age of Onset   • Liver cancer Other         uncle   • Heart disease Other    • Diabetes Other         grandparents      Past Surgical History:   Procedure Laterality Date   • BREAST AUGMENTATION  2006   • BREAST SURGERY  2005   • CARPAL TUNNEL RELEASE     • OTHER SURGICAL HISTORY  2011    D & C    • TRIGGER FINGER RELEASE      thumb   • WISDOM TOOTH EXTRACTION  2002        Current Outpatient Medications:   •  amphetamine-dextroamphetamine (ADDERALL) 20 MG tablet, Take 1 tablet by mouth 2 (Two) Times a Day., Disp: 60 tablet, Rfl: 0  •  aspirin 81 MG EC tablet, Take 81 mg by mouth Daily., Disp: , Rfl:   •  buPROPion SR (WELLBUTRIN SR) 150 MG 12 hr tablet, Take 1 tablet by mouth 2 (Two) Times a Day., Disp: 180 tablet, Rfl: 1  •  Folic Acid 5 MG capsule, Take 5 mg by mouth Daily., Disp: 60 each, Rfl: 6  •  levothyroxine (SYNTHROID, LEVOTHROID) 88 MCG tablet, Take 1 tablet by mouth Daily., Disp: 60 tablet, Rfl: 6  •  liothyronine (CYTOMEL) 5 MCG tablet, Take 2 tablets by mouth Daily., Disp: 180 tablet, Rfl: 1  •  LORazepam  "(ATIVAN) 0.5 MG tablet, Take 0.5 mg by mouth Every 8 (Eight) Hours As Needed for Anxiety., Disp: , Rfl:   •  metFORMIN (GLUCOPHAGE) 500 MG tablet, Take 1 tablet by mouth 2 (Two) Times a Day With Meals., Disp: 180 tablet, Rfl: 1  •  pantoprazole (PROTONIX) 40 MG EC tablet, TAKE 1 TABLET DAILY, Disp: 90 tablet, Rfl: 1  •  Plecanatide (Trulance) 3 MG tablet, Take 1 tablet by mouth Daily., Disp: 90 tablet, Rfl: 1  •  spironolactone (ALDACTONE) 100 MG tablet, Take 1 tablet by mouth Daily., Disp: 90 tablet, Rfl: 1  •  vitamin D (ERGOCALCIFEROL) 1.25 MG (07523 UT) capsule capsule, TAKE 1 CAPSULE EVERY 7 DAYS, Disp: 12 capsule, Rfl: 1  •  zolpidem (AMBIEN) 10 MG tablet, Take 1 tablet by mouth At Night As Needed for Sleep., Disp: 60 tablet, Rfl: 1  •  Semaglutide-Weight Management (Wegovy) 2.4 MG/0.75ML solution auto-injector, Inject 2.4 mg under the skin into the appropriate area as directed 1 (One) Time Per Week., Disp: 8 mL, Rfl: 6  •  Vortioxetine HBr (Trintellix) 10 MG tablet, Take 10 mg by mouth Daily., Disp: 60 tablet, Rfl: 0    Objective   Vital Signs:     /86 (BP Location: Right arm)   Pulse 101   Ht 162.6 cm (64\")   Wt 112 kg (246 lb 12.8 oz)   SpO2 97%   BMI 42.36 kg/m²    Estimated body mass index is 42.36 kg/m² as calculated from the following:    Height as of this encounter: 162.6 cm (64\").    Weight as of this encounter: 112 kg (246 lb 12.8 oz).     Physical Exam  Vitals and nursing note reviewed.   Constitutional:       Appearance: Normal appearance. She is obese.   HENT:      Head: Normocephalic and atraumatic.   Cardiovascular:      Rate and Rhythm: Normal rate and regular rhythm.   Pulmonary:      Effort: Pulmonary effort is normal.      Breath sounds: Normal breath sounds.   Musculoskeletal:      Cervical back: Neck supple.   Neurological:      Mental Status: She is alert.   Psychiatric:         Mood and Affect: Mood normal.         Behavior: Behavior normal.        Result Review :     Common " labs    Common Labsle 3/30/21 8/23/21 11/16/21 11/16/21      1655 1655   Glucose 77   74   BUN 9   9   Creatinine 0.76   0.64   eGFR Non African Am    102   Sodium 134 (A)   135 (A)   Potassium 4.3   4.0   Chloride 103   102   Calcium 9.1   9.8   Albumin 4.2   4.60   Total Bilirubin 0.25   <0.2   Alkaline Phosphatase 65   76   AST (SGOT) 16   8   ALT (SGPT) 13   8   WBC 6.43 8.93 9.66    Hemoglobin 14.0 12.4 13.4    Hematocrit 42.3 37.7 39.8    Platelets 315 288 347    Total Cholesterol 172      Triglycerides 79      HDL Cholesterol 53      LDL Cholesterol  103 (A)      (A) Abnormal value       Comments are available for some flowsheets but are not being displayed.                         Assessment and Plan      Diagnoses and all orders for this visit:    1. Anxiety (Primary)  -     Vortioxetine HBr (Trintellix) 10 MG tablet; Take 10 mg by mouth Daily.  Dispense: 60 tablet; Refill: 0    2. Depression, unspecified depression type  -     Vortioxetine HBr (Trintellix) 10 MG tablet; Take 10 mg by mouth Daily.  Dispense: 60 tablet; Refill: 0    3. Attention deficit hyperactivity disorder (ADHD), unspecified ADHD type  Overview:  Controlled with adderall 20mg BID      4. Hypothyroidism, unspecified type  Overview:  lexvoxyl 75mcg QD well controlled    Orders:  -     levothyroxine (SYNTHROID, LEVOTHROID) 88 MCG tablet; Take 1 tablet by mouth Daily.  Dispense: 60 tablet; Refill: 6    5. Folic acid deficiency  -     Folic Acid 5 MG capsule; Take 5 mg by mouth Daily.  Dispense: 60 each; Refill: 6    6. Insomnia, unspecified type  Overview:  Ambien 10mg QHS PRN well controlled    Orders:  -     zolpidem (AMBIEN) 10 MG tablet; Take 1 tablet by mouth At Night As Needed for Sleep.  Dispense: 60 tablet; Refill: 1    7. Class 3 severe obesity due to excess calories with serious comorbidity and body mass index (BMI) of 40.0 to 44.9 in adult (HCC)  Overview:  Disc diet and exercise    Orders:  -     Semaglutide-Weight Management  (Wegovy) 2.4 MG/0.75ML solution auto-injector; Inject 2.4 mg under the skin into the appropriate area as directed 1 (One) Time Per Week.  Dispense: 8 mL; Refill: 6     Follow Up     Return in about 3 months (around 3/27/2022).    Patient was given instructions and counseling regarding her condition or for health maintenance advice. Please see specific information pulled into the AVS if appropriate.     I have reviewed information obtained and documented by others and I have confirmed the accuracy of this documented note.    DORY Griffiths

## 2021-12-29 ENCOUNTER — TELEPHONE (OUTPATIENT)
Dept: FAMILY MEDICINE CLINIC | Facility: CLINIC | Age: 41
End: 2021-12-29

## 2021-12-29 NOTE — TELEPHONE ENCOUNTER
Caller: [facility] - [place], [place] - [address] - [phone] [facility] [phone] [facility]    Relationship: Pharmacy    Best call back number: [phone]    REF [phone]    What is the best time to reach you: ANYTIME     Who are you requesting to speak with (clinical staff, provider,  specific staff member): CLINICAL    Do you know the name of the person who called: CHIRAG         What was the call regarding:     WEGOVY INJECTION 2.4 IS NOT AVAILABLE THROUGH  HOME DELIVERY. THEY ARE REQUESTING FOR ANOTHER ALTERNATIVE.  PLEASE CALL AND ADVISE     PLEASE USE THE REFERENCE NUMBER WHEN CALLING     Do you require a callback      YES

## 2021-12-30 ENCOUNTER — TELEPHONE (OUTPATIENT)
Dept: FAMILY MEDICINE CLINIC | Facility: CLINIC | Age: 41
End: 2021-12-30

## 2021-12-31 DIAGNOSIS — E03.9 HYPOTHYROIDISM, UNSPECIFIED TYPE: ICD-10-CM

## 2022-01-03 RX ORDER — LIOTHYRONINE SODIUM 5 UG/1
10 TABLET ORAL DAILY
Qty: 60 TABLET | Refills: 1 | Status: SHIPPED | OUTPATIENT
Start: 2022-01-03 | End: 2022-06-07

## 2022-01-04 DIAGNOSIS — R06.81 APNEA: Primary | ICD-10-CM

## 2022-01-04 DIAGNOSIS — R73.9 HYPERGLYCEMIA: Primary | ICD-10-CM

## 2022-01-05 ENCOUNTER — TELEPHONE (OUTPATIENT)
Dept: FAMILY MEDICINE CLINIC | Facility: CLINIC | Age: 42
End: 2022-01-05

## 2022-01-05 DIAGNOSIS — E66.01 CLASS 3 SEVERE OBESITY DUE TO EXCESS CALORIES WITH SERIOUS COMORBIDITY AND BODY MASS INDEX (BMI) OF 40.0 TO 44.9 IN ADULT: ICD-10-CM

## 2022-01-05 RX ORDER — SEMAGLUTIDE 2.4 MG/.75ML
2.4 INJECTION, SOLUTION SUBCUTANEOUS WEEKLY
Qty: 8 ML | Refills: 6 | Status: SHIPPED | OUTPATIENT
Start: 2022-01-05 | End: 2022-03-28

## 2022-01-05 RX ORDER — SEMAGLUTIDE 2.4 MG/.75ML
2.4 INJECTION, SOLUTION SUBCUTANEOUS WEEKLY
Qty: 8 ML | Refills: 6 | Status: SHIPPED | OUTPATIENT
Start: 2022-01-05 | End: 2022-01-05 | Stop reason: SDUPTHER

## 2022-01-05 NOTE — TELEPHONE ENCOUNTER
----- Message from Audra Daniel sent at 1/5/2022  3:28 PM EST -----  Regarding: Wegovy  The [facility] on [address] or [facility] will be fine. Anywhere you think would be most likely to have it will be fine.   Thank you!

## 2022-01-05 NOTE — TELEPHONE ENCOUNTER
----- Message from Audra Daniel sent at 1/4/2022  4:12 PM EST -----  Regarding: wegovy alternative  Is that in [place]?    Can you show that I lost weight the first month on the wegovy?  Can you show that I’ve tried phentermine, saxenda & ozempic already?  Would that make a difference?    Since that was already approved, can Ever[facility] try a refill on the 1.7mg to buy some time for me to appeal?

## 2022-01-10 DIAGNOSIS — F32.9 MAJOR DEPRESSIVE DISORDER, REMISSION STATUS UNSPECIFIED, UNSPECIFIED WHETHER RECURRENT: ICD-10-CM

## 2022-01-10 DIAGNOSIS — F41.9 ANXIETY DISORDER, UNSPECIFIED TYPE: ICD-10-CM

## 2022-01-10 RX ORDER — SPIRONOLACTONE 100 MG/1
TABLET, FILM COATED ORAL
Qty: 60 TABLET | Refills: 2 | Status: SHIPPED | OUTPATIENT
Start: 2022-01-10

## 2022-01-10 RX ORDER — BUPROPION HYDROCHLORIDE 150 MG/1
TABLET, EXTENDED RELEASE ORAL
Qty: 120 TABLET | Refills: 2 | Status: SHIPPED | OUTPATIENT
Start: 2022-01-10

## 2022-01-25 ENCOUNTER — TRANSCRIBE ORDERS (OUTPATIENT)
Dept: ADMINISTRATIVE | Facility: HOSPITAL | Age: 42
End: 2022-01-25

## 2022-01-25 DIAGNOSIS — N93.9 ABNORMAL UTERINE BLEEDING: ICD-10-CM

## 2022-01-25 DIAGNOSIS — R10.2 PELVIC PAIN: Primary | ICD-10-CM

## 2022-01-27 ENCOUNTER — OFFICE VISIT (OUTPATIENT)
Dept: SLEEP MEDICINE | Facility: HOSPITAL | Age: 42
End: 2022-01-27

## 2022-01-27 VITALS
HEIGHT: 64 IN | HEART RATE: 86 BPM | BODY MASS INDEX: 41.83 KG/M2 | OXYGEN SATURATION: 97 % | TEMPERATURE: 97.3 F | DIASTOLIC BLOOD PRESSURE: 63 MMHG | WEIGHT: 245 LBS | SYSTOLIC BLOOD PRESSURE: 109 MMHG

## 2022-01-27 DIAGNOSIS — F51.04 PSYCHOPHYSIOLOGICAL INSOMNIA: ICD-10-CM

## 2022-01-27 DIAGNOSIS — G47.10 HYPERSOMNIA: Primary | ICD-10-CM

## 2022-01-27 DIAGNOSIS — Z72.821 INADEQUATE SLEEP HYGIENE: ICD-10-CM

## 2022-01-27 DIAGNOSIS — G47.63 SLEEP-RELATED BRUXISM: ICD-10-CM

## 2022-01-27 DIAGNOSIS — G47.8 NON-RESTORATIVE SLEEP: ICD-10-CM

## 2022-01-27 DIAGNOSIS — E66.01 MORBID OBESITY: ICD-10-CM

## 2022-01-27 PROCEDURE — G0463 HOSPITAL OUTPT CLINIC VISIT: HCPCS | Performed by: FAMILY MEDICINE

## 2022-01-27 PROCEDURE — 99204 OFFICE O/P NEW MOD 45 MIN: CPT | Performed by: FAMILY MEDICINE

## 2022-01-27 NOTE — PROGRESS NOTES
[facility] New [facility]Consultation       Reason for Consultation: Apnea      Patient Care Team:  Everardo Coleman PA as PCP - General (Physician Assistant)  Norma Brandt MD as Consulting Physician (Sleep Medicine)      History of present illness:  Thank you for asking me to see your patient.  The patient is a 41 y.o. female with ADHD insomnia anxiety depression acid reflux hypothyroidism type 2 diabetes mellitus presents today with concern for sleep disorder. No history of prior sleep study or tonsillectomy. Uses Ambien 10 mg nightly to help with insomnia. Patient reports hypersomnia nonrestorative sleep morning headaches weight gain over the past 5 years sleep-related bruxism more sleepy when she increases her sleep time. No family history of sleep apnea she is aware of. BMI 42. Had Covid in August and still having brain fog. Her physician is curious if oxygen is dropping at night.    Sleep Schedule:  Bed time: Midnight or later  Sleep latency: Varies  Wake time: 4 AM when work varies when off of work  Average hours slept: 4-6 weekdays 10+ weekends  Non-restorative sleep: Yes  Number of naps per day: 1 to 2-hour nap if possible  Rotating shifts?: No  Nocturia: N  Electronics in bedroom: N    Excessive daytime sleepiness or drowsiness:Y  Any accidents at work due to sleepiness in the last 5 years:N  Any difficulty driving due to sleepiness or being drowsy: N  Weight changed in the last 5 years:Y - GAINED    Snoring:Y  Witnessed apneas:N  Have you ever awakened gasping for breath, coughing, choking or respiratory discomfort: N  Morning headaches: Y  Awaken with a sore throat or dry mouth: N    Any reports of leg jerking at night: N  Urge sensations: N  Does pain disrupt sleep: N  Sweating during sleep: Y  Teeth grinding: Y    Any sudden episodes of sleep during the day: N  Sleep paralysis/hallucinations: N  Muscle weakness with laughing/anger: N  Nightmares: N  Sleep walking: N    Are you sleepy when you  increase your sleep time: Y  Do you sleep better away from your own bed: N    ESS: 10    Social History: No tobacco use occasional alcohol use 3-4 caffeinated averages a day no drug use    Allergies:  Codeine    Family History: LESLIE no       Current Outpatient Medications:   •  amphetamine-dextroamphetamine (ADDERALL) 20 MG tablet, Take 1 tablet by mouth 2 (Two) Times a Day., Disp: 120 tablet, Rfl: 0  •  aspirin 81 MG EC tablet, Take 81 mg by mouth Daily., Disp: , Rfl:   •  buPROPion SR (WELLBUTRIN SR) 150 MG 12 hr tablet, TAKE 1 TABLET TWICE A DAY, Disp: 120 tablet, Rfl: 2  •  Folic Acid 5 MG capsule, Take 5 mg by mouth Daily., Disp: 60 each, Rfl: 6  •  levothyroxine (SYNTHROID, LEVOTHROID) 88 MCG tablet, Take 1 tablet by mouth Daily., Disp: 60 tablet, Rfl: 6  •  liothyronine (CYTOMEL) 5 MCG tablet, TAKE 2 TABLETS BY MOUTH DAILY, Disp: 60 tablet, Rfl: 1  •  LORazepam (ATIVAN) 0.5 MG tablet, Take 0.5 mg by mouth Every 8 (Eight) Hours As Needed for Anxiety., Disp: , Rfl:   •  metFORMIN (GLUCOPHAGE) 500 MG tablet, TAKE 1 TABLET TWICE DAILY  WITH MEALS, Disp: 120 tablet, Rfl: 2  •  pantoprazole (PROTONIX) 40 MG EC tablet, TAKE 1 TABLET DAILY, Disp: 90 tablet, Rfl: 1  •  Plecanatide (Trulance) 3 MG tablet, Take 1 tablet by mouth Daily., Disp: 90 tablet, Rfl: 1  •  Semaglutide-Weight Management (Wegovy) 2.4 MG/0.75ML solution auto-injector, Inject 2.4 mg under the skin into the appropriate area as directed 1 (One) Time Per Week., Disp: 8 mL, Rfl: 6  •  spironolactone (ALDACTONE) 100 MG tablet, TAKE 1 TABLET DAILY, Disp: 60 tablet, Rfl: 2  •  vitamin D (ERGOCALCIFEROL) 1.25 MG (16065 UT) capsule capsule, TAKE 1 CAPSULE EVERY 7 DAYS, Disp: 12 capsule, Rfl: 1  •  Vortioxetine HBr (Trintellix) 10 MG tablet, Take 10 mg by mouth Daily., Disp: 60 tablet, Rfl: 0  •  zolpidem (AMBIEN) 10 MG tablet, Take 1 tablet by mouth At Night As Needed for Sleep., Disp: 60 tablet, Rfl: 1    Vital Signs:    Vitals:    01/27/22 1500   BP: 109/63  "  Pulse: 86   Temp: 97.3 °F (36.3 °C)   SpO2: 97%   Weight: 111 kg (245 lb)   Height: 162.6 cm (64\")      Body mass index is 42.05 kg/m².         REVIEW OF SYSTEMS.  Full review of systems available on the intake form which is scanned in the media tab.  The relevant positive are noted below  1. Daytime excessive sleepiness with Prichard Sleepiness Scale :Total score: 10   2. Snoring  3. Fatigue swollen ankles anxiety depression frequent heartburn      Physical exam:  Vitals:    01/27/22 1500   BP: 109/63   Pulse: 86   Temp: 97.3 °F (36.3 °C)   SpO2: 97%   Weight: 111 kg (245 lb)   Height: 162.6 cm (64\")    Body mass index is 42.05 kg/m².    HEENT: Head is atraumatic, normocephalic  Eyes: pupils are round equal and reacting to light and accommodation, conjunctiva normal  Nose: no nasal septal defects or deviation and the nasal passages are clear, no nasal polyps,  Throat:  tongue normal, oral airway Mallampati class II  NECK: , trachea is in the midline, thyroid not enlarged  RESPIRATORY SYSTEM: Breath sounds are equal on both sides, there are no wheezes   CARDIOVASULAR SYSTEM: Heart sounds are regular rhythm and santos rate, no edema  EXTREMITES: No cyanosis, clubbing  NEUROLOGICAL SYSTEM: Oriented x 3, no gross motor defects, gait normal      Impression:  1. Hypersomnia    2. Non-restorative sleep    3. Morbid obesity (HCC)    4. Sleep-related bruxism    5. Psychophysiological insomnia    6. Inadequate sleep hygiene        Plan:    Good sleep hygiene measures should be maintained.  Weight loss would be beneficial in this patient who is morbidly obese BMI 42.    I discussed the pathophysiology of obstructive sleep apnea with the patient.  We discussed the adverse outcomes associated with untreated sleep-disordered breathing.  We discussed treatment modalities of obstructive sleep apnea including CPAP device as well as oral mandibular advancement device. Sleep study will be scheduled to establish definitive diagnosis " of sleep disorder breathing.  Weight loss will be strongly beneficial in order to reduce the severity of sleep-disordered breathing.  Patient has narrow oropharyngeal structure.  Caution during activities that require prolonged concentration is strongly advised.  Patient will be notified of sleep study results after sleep study is completed.  If sleep apnea is only mild,  oral mandibular advancement device may be one of the treatment options.  However if sleep apnea is moderately severe, CPAP treatment will be strongly encouraged.  The patient is not opposed to treatment with CPAP device if we confirm significant obstructive sleep apnea on polysomnography.     CAN CONSIDER CBT I IN THE FUTURE.    Thank you for allowing me to participate in your patient's care.    Norma Brandt MD  Sleep Medicine  01/27/22  15:34 EST

## 2022-02-22 ENCOUNTER — HOSPITAL ENCOUNTER (OUTPATIENT)
Dept: ULTRASOUND IMAGING | Facility: HOSPITAL | Age: 42
Discharge: HOME | End: 2022-02-22
Admitting: OBSTETRICS & GYNECOLOGY

## 2022-02-22 DIAGNOSIS — N93.9 ABNORMAL UTERINE BLEEDING: ICD-10-CM

## 2022-02-22 DIAGNOSIS — R10.2 PELVIC PAIN: ICD-10-CM

## 2022-02-22 PROCEDURE — 76856 US EXAM PELVIC COMPLETE: CPT

## 2022-02-23 ENCOUNTER — HOSPITAL ENCOUNTER (OUTPATIENT)
Dept: SLEEP MEDICINE | Facility: HOSPITAL | Age: 42
Discharge: HOME | End: 2022-02-23
Admitting: FAMILY MEDICINE

## 2022-02-23 DIAGNOSIS — G47.8 NON-RESTORATIVE SLEEP: ICD-10-CM

## 2022-02-23 DIAGNOSIS — G47.63 SLEEP-RELATED BRUXISM: ICD-10-CM

## 2022-02-23 DIAGNOSIS — E66.01 MORBID OBESITY: ICD-10-CM

## 2022-02-23 DIAGNOSIS — F51.04 PSYCHOPHYSIOLOGICAL INSOMNIA: ICD-10-CM

## 2022-02-23 DIAGNOSIS — G47.10 HYPERSOMNIA: ICD-10-CM

## 2022-02-23 PROCEDURE — 95806 SLEEP STUDY UNATT&RESP EFFT: CPT

## 2022-02-23 PROCEDURE — 95806 SLEEP STUDY UNATT&RESP EFFT: CPT | Performed by: FAMILY MEDICINE

## 2022-02-24 DIAGNOSIS — E66.01 MORBID OBESITY: ICD-10-CM

## 2022-02-24 DIAGNOSIS — F51.01 PRIMARY INSOMNIA: Primary | ICD-10-CM

## 2022-02-24 DIAGNOSIS — G47.8 NON-RESTORATIVE SLEEP: ICD-10-CM

## 2022-02-24 DIAGNOSIS — G47.10 HYPERSOMNIA: ICD-10-CM

## 2022-03-01 ENCOUNTER — TELEPHONE (OUTPATIENT)
Dept: SLEEP MEDICINE | Facility: HOSPITAL | Age: 42
End: 2022-03-01

## 2022-03-02 DIAGNOSIS — E55.9 VITAMIN D DEFICIENCY: ICD-10-CM

## 2022-03-02 RX ORDER — ERGOCALCIFEROL 1.25 MG/1
50000 CAPSULE ORAL
Qty: 13 CAPSULE | Refills: 1 | Status: SHIPPED | OUTPATIENT
Start: 2022-03-02 | End: 2022-04-25 | Stop reason: SDUPTHER

## 2022-03-10 ENCOUNTER — TRANSCRIBE ORDERS (OUTPATIENT)
Dept: ADMINISTRATIVE | Facility: HOSPITAL | Age: 42
End: 2022-03-10

## 2022-03-10 DIAGNOSIS — F41.9 ANXIETY: ICD-10-CM

## 2022-03-10 DIAGNOSIS — F32.A DEPRESSION, UNSPECIFIED DEPRESSION TYPE: ICD-10-CM

## 2022-03-10 DIAGNOSIS — Z12.31 SCREENING MAMMOGRAM FOR BREAST CANCER: Primary | ICD-10-CM

## 2022-03-10 DIAGNOSIS — F90.9 ATTENTION DEFICIT HYPERACTIVITY DISORDER (ADHD), UNSPECIFIED ADHD TYPE: ICD-10-CM

## 2022-03-10 RX ORDER — VORTIOXETINE 10 MG/1
TABLET, FILM COATED ORAL
Qty: 90 TABLET | Refills: 1 | Status: SHIPPED | OUTPATIENT
Start: 2022-03-10 | End: 2022-08-11

## 2022-03-11 DIAGNOSIS — K21.9 GASTROESOPHAGEAL REFLUX DISEASE WITHOUT ESOPHAGITIS: ICD-10-CM

## 2022-03-11 RX ORDER — PANTOPRAZOLE SODIUM 40 MG/1
TABLET, DELAYED RELEASE ORAL
Qty: 60 TABLET | Refills: 2 | Status: SHIPPED | OUTPATIENT
Start: 2022-03-11 | End: 2022-03-29 | Stop reason: SDUPTHER

## 2022-03-16 ENCOUNTER — TELEPHONE (OUTPATIENT)
Dept: FAMILY MEDICINE CLINIC | Facility: CLINIC | Age: 42
End: 2022-03-16

## 2022-03-16 NOTE — TELEPHONE ENCOUNTER
Pt requesting rf on Adderall 20mg 1 tab po bid #[id number]    Last fill-12/27/21  OV-12/27/21  Juan Alberto-3/16/22  UDS-9/23/21

## 2022-03-16 NOTE — TELEPHONE ENCOUNTER
LM advising pt per Everardo we will no longer be able to refill Adderall after this month, advised pt to call back if she wants us to place referral for psych

## 2022-03-17 RX ORDER — DEXTROAMPHETAMINE SACCHARATE, AMPHETAMINE ASPARTATE, DEXTROAMPHETAMINE SULFATE AND AMPHETAMINE SULFATE 5; 5; 5; 5 MG/1; MG/1; MG/1; MG/1
20 TABLET ORAL 2 TIMES DAILY
Qty: 120 TABLET | Refills: 0 | Status: SHIPPED | OUTPATIENT
Start: 2022-03-17

## 2022-03-26 DIAGNOSIS — E66.01 CLASS 3 SEVERE OBESITY DUE TO EXCESS CALORIES WITH SERIOUS COMORBIDITY AND BODY MASS INDEX (BMI) OF 40.0 TO 44.9 IN ADULT: ICD-10-CM

## 2022-03-28 RX ORDER — SEMAGLUTIDE 2.4 MG/.75ML
INJECTION, SOLUTION SUBCUTANEOUS
Qty: 9 ML | Refills: 0 | Status: SHIPPED | OUTPATIENT
Start: 2022-03-28 | End: 2022-04-25 | Stop reason: SDUPTHER

## 2022-03-29 ENCOUNTER — OFFICE VISIT (OUTPATIENT)
Dept: GASTROENTEROLOGY | Facility: CLINIC | Age: 42
End: 2022-03-29

## 2022-03-29 ENCOUNTER — PREP FOR SURGERY (OUTPATIENT)
Dept: SURGERY | Facility: SURGERY CENTER | Age: 42
End: 2022-03-29

## 2022-03-29 VITALS
SYSTOLIC BLOOD PRESSURE: 108 MMHG | WEIGHT: 238.8 LBS | HEIGHT: 64 IN | HEART RATE: 98 BPM | TEMPERATURE: 97.3 F | OXYGEN SATURATION: 99 % | BODY MASS INDEX: 40.77 KG/M2 | DIASTOLIC BLOOD PRESSURE: 76 MMHG

## 2022-03-29 DIAGNOSIS — Z12.11 COLON CANCER SCREENING: ICD-10-CM

## 2022-03-29 DIAGNOSIS — K58.9 IRRITABLE BOWEL SYNDROME, UNSPECIFIED TYPE: ICD-10-CM

## 2022-03-29 DIAGNOSIS — R10.13 EPIGASTRIC PAIN: ICD-10-CM

## 2022-03-29 DIAGNOSIS — Z86.010 HX OF ADENOMATOUS COLONIC POLYPS: ICD-10-CM

## 2022-03-29 DIAGNOSIS — R11.2 NAUSEA AND VOMITING, INTRACTABILITY OF VOMITING NOT SPECIFIED, UNSPECIFIED VOMITING TYPE: ICD-10-CM

## 2022-03-29 DIAGNOSIS — K21.9 GASTROESOPHAGEAL REFLUX DISEASE WITHOUT ESOPHAGITIS: Primary | ICD-10-CM

## 2022-03-29 DIAGNOSIS — K59.00 CONSTIPATION, UNSPECIFIED CONSTIPATION TYPE: ICD-10-CM

## 2022-03-29 PROBLEM — Z86.0101 HX OF ADENOMATOUS COLONIC POLYPS: Status: ACTIVE | Noted: 2022-03-29

## 2022-03-29 PROCEDURE — 99214 OFFICE O/P EST MOD 30 MIN: CPT | Performed by: NURSE PRACTITIONER

## 2022-03-29 RX ORDER — PLECANATIDE 3 MG/1
1 TABLET ORAL DAILY
Qty: 90 TABLET | Refills: 3 | Status: SHIPPED | OUTPATIENT
Start: 2022-03-29 | End: 2022-05-07 | Stop reason: SDUPTHER

## 2022-03-29 RX ORDER — SODIUM CHLORIDE 0.9 % (FLUSH) 0.9 %
10 SYRINGE (ML) INJECTION AS NEEDED
Status: CANCELLED | OUTPATIENT
Start: 2022-03-29

## 2022-03-29 RX ORDER — SODIUM CHLORIDE, SODIUM LACTATE, POTASSIUM CHLORIDE, CALCIUM CHLORIDE 600; 310; 30; 20 MG/100ML; MG/100ML; MG/100ML; MG/100ML
30 INJECTION, SOLUTION INTRAVENOUS CONTINUOUS PRN
Status: CANCELLED | OUTPATIENT
Start: 2022-03-29

## 2022-03-29 RX ORDER — PANTOPRAZOLE SODIUM 40 MG/1
TABLET, DELAYED RELEASE ORAL
Qty: 60 TABLET | Refills: 2 | Status: SHIPPED | OUTPATIENT
Start: 2022-03-29 | End: 2022-04-01 | Stop reason: SDUPTHER

## 2022-03-29 RX ORDER — SODIUM CHLORIDE 0.9 % (FLUSH) 0.9 %
3 SYRINGE (ML) INJECTION EVERY 12 HOURS SCHEDULED
Status: CANCELLED | OUTPATIENT
Start: 2022-03-29

## 2022-03-29 NOTE — PATIENT INSTRUCTIONS
1.  For worsening GERD, we will increase your pantoprazole to 40 mg twice daily.  We recommend he take this medication 1 hour before your first meal and last meal of the day.    2.  For further evaluation of worsening GERD intermittent nausea and vomiting, we will schedule an EGD.    3.  You may continue to use Zofran intermittently for nausea and vomiting.  Please note that this can be constipating if taken higher doses.    4.  For constipation, continue Trulance daily.  Refills have been sent to your pharmacy.    5.  Additionally for constipation, we recommend that you add Senokot into your regimen.  This is a natural vegetable laxative that can be purchased over-the-counter at your local grocery or pharmacy.  We recommend that you start with 1 capsule daily and may titrate accordingly based upon how your bowel habits respond.    6.  If you find that Senokot is not fully effective when working in conjunction with Trulance, you may also start a daily probiotic.  We recommend [employer]-or its generic equivalent which is available over-the-counter for purchase at your local grocery or pharmacy.  We recommend 1 capsule daily.    7.  Due to your history of adenomatous colon polyps, we will place an order for colonoscopy.    8.  We will plan for office follow-up 4 weeks after procedures to discuss results, reassess symptoms, and devise plan of care moving forward

## 2022-03-29 NOTE — PROGRESS NOTES
Chief Complaint   Patient presents with   • Constipation   • Vomiting   • Nausea   • Heartburn         History of Present Illness  42-year-old female presents today for for follow-up.  Her  has accompanied her on her office visit today.  She was a previous patient in our last practice.  She has a history of GERD, colon polyps, and constipation.    She is currently taking pantoprazole 40 mg once daily for GERD.  She reports worsening GERD symptoms since November.  She reports is a positive for H. pylori via blood test and was treated with antibiotic regimen, but states that she did not gain any symptom relief.  She reports experiencing epigastric pain and reflux up in the back of her throat.  She is also experiencing some bile emesis, and is not for sure if this is secondary to reflux or the initiation of a new weight loss medication called Wegovy.  She states that this new medication does contribute to constipation and slows down her stomach, which she feels could be contributing to symptoms.    She reports a longstanding history of constipation.  She has previously tried Linzess, which caused significant diarrhea.  She has tried Amitiza as well with no effect on symptoms.  She is currently taking Trulance daily.  Occasionally she will take a stool softener.  With her current regimen she is only having approximately 1 bowel movement per week.  Prior to start of Wegovy he would have 2-3 bowel movements per week.  She does report straining.  She denies any melena or hematochezia.  She has a history of adenomatous colon polyps and will be due for her surveillance colonoscopy April 2022.    Review of Systems   Constitutional: Negative for fever and unexpected weight change.   HENT: Negative for trouble swallowing.    Cardiovascular: Negative for chest pain.   Gastrointestinal: Positive for abdominal distention, abdominal pain, constipation, nausea and vomiting. Negative for anal bleeding, blood in stool, diarrhea  "and rectal pain.      Result Review :       SCANNED - COLONOSCOPY (04/09/2019)  US Pelvis Complete (02/22/2022 16:41)  Helicobacter Pylori, IgA IgG IgM (11/16/2021 16:55)    Vital Signs:   /76   Pulse 98   Temp 97.3 °F (36.3 °C)   Ht 162.6 cm (64\")   Wt 108 kg (238 lb 12.8 oz)   SpO2 99%   BMI 40.99 kg/m²     Body mass index is 40.99 kg/m².     Physical Exam  Vitals reviewed.   Constitutional:       Appearance: Normal appearance.   HENT:      Head: Normocephalic.      Nose: Nose normal.      Mouth/Throat:      Mouth: Mucous membranes are moist.   Eyes:      General: No scleral icterus.     Extraocular Movements: Extraocular movements intact.   Cardiovascular:      Rate and Rhythm: Normal rate and regular rhythm.      Pulses: Normal pulses.      Heart sounds: Normal heart sounds.   Pulmonary:      Effort: Pulmonary effort is normal. No respiratory distress.      Breath sounds: Normal breath sounds.   Abdominal:      General: Abdomen is flat. Bowel sounds are normal. There is no distension.      Palpations: Abdomen is soft. There is no mass.      Tenderness: There is no abdominal tenderness. There is no guarding.   Musculoskeletal:         General: Normal range of motion.      Cervical back: Normal range of motion and neck supple.   Skin:     General: Skin is warm and dry.   Neurological:      General: No focal deficit present.      Mental Status: She is alert and oriented to person, place, and time.   Psychiatric:         Mood and Affect: Mood normal.         Behavior: Behavior normal.         Thought Content: Thought content normal.         Judgment: Judgment normal.       Assessment and Plan    Diagnoses and all orders for this visit:    1. Gastroesophageal reflux disease without esophagitis (Primary)  -     pantoprazole (PROTONIX) 40 MG EC tablet; Take 1 r  Dispense: 60 tablet; Refill: 2    2. Epigastric pain    3. Constipation, unspecified constipation type    4. Hx of adenomatous colonic polyps    5. " Colon cancer screening    6. Irritable bowel syndrome, unspecified type  -     Plecanatide (Trulance) 3 MG tablet; Take 1 tablet by mouth Daily.  Dispense: 90 tablet; Refill: 3    7. Nausea and vomiting, intractability of vomiting not specified, unspecified vomiting type           Patient Instructions   1.  For worsening GERD, we will increase your pantoprazole to 40 mg twice daily.  We recommend he take this medication 1 hour before your first meal and last meal of the day.    2.  For further evaluation of worsening GERD intermittent nausea and vomiting, we will schedule an EGD.    3.  You may continue to use Zofran intermittently for nausea and vomiting.  Please note that this can be constipating if taken higher doses.    4.  For constipation, continue Trulance daily.  Refills have been sent to your pharmacy.    5.  Additionally for constipation, we recommend that you add Senokot into your regimen.  This is a natural vegetable laxative that can be purchased over-the-counter at your local grocery or pharmacy.  We recommend that you start with 1 capsule daily and may titrate accordingly based upon how your bowel habits respond.    6.  If you find that Senokot is not fully effective when working in conjunction with Trulance, you may also start a daily probiotic.  We recommend [employer]-or its generic equivalent which is available over-the-counter for purchase at your local grocery or pharmacy.  We recommend 1 capsule daily.    7.  Due to your history of adenomatous colon polyps, we will place an order for colonoscopy.    8.  We will plan for office follow-up 4 weeks after procedures to discuss results, reassess symptoms, and devise plan of care moving forward       Discussion:     We will increase the patient's pantoprazole to twice daily dosing at this time due to worsening symptoms and have recommended that she take this 1 hour before her first and last meals of the day.  She may utilize Zofran as  needed for nausea, and has been made aware of the potential side effects of constipation.  We will also schedule an EGD for worsening GERD and nausea and vomiting.    For constipation we will have her continue Trulance and we will add senna to her regimen to see if this better helps manage her symptoms by approaching her constipation from 2 different mechanisms of action.  To consider Sitz marker study in the future based upon response.  We have also recommended the addition of a daily probiotic if the senna is ineffective.  Due to her history of colon polyps we will schedule colonoscopy.  We will plan for office follow-up 4 weeks after procedures to discuss results, reassess symptoms, and devise plan of care moving forward.  Both the patient and her  verbalized understanding above plan of care and are in agreement.  All questions answered and support provided.    EMR Dragon/Transcription Disclaimer:  This document has been Dictated utilizing Dragon dictation.

## 2022-03-30 ENCOUNTER — TELEPHONE (OUTPATIENT)
Dept: GASTROENTEROLOGY | Facility: CLINIC | Age: 42
End: 2022-03-30

## 2022-03-31 ENCOUNTER — TELEPHONE (OUTPATIENT)
Dept: GASTROENTEROLOGY | Facility: CLINIC | Age: 42
End: 2022-03-31

## 2022-03-31 DIAGNOSIS — K21.9 GASTROESOPHAGEAL REFLUX DISEASE WITHOUT ESOPHAGITIS: ICD-10-CM

## 2022-04-01 RX ORDER — PANTOPRAZOLE SODIUM 40 MG/1
40 TABLET, DELAYED RELEASE ORAL 2 TIMES DAILY
Qty: 180 TABLET | Refills: 0 | Status: SHIPPED | OUTPATIENT
Start: 2022-04-01

## 2022-04-01 NOTE — TELEPHONE ENCOUNTER
Marychuy sent a fax today requesting clarification of RX for Pantoprazole 40 mg that was sent in.  Please resend with directions for taking RX.

## 2022-04-23 DIAGNOSIS — K58.9 IRRITABLE BOWEL SYNDROME, UNSPECIFIED TYPE: ICD-10-CM

## 2022-04-25 ENCOUNTER — OFFICE VISIT (OUTPATIENT)
Dept: FAMILY MEDICINE CLINIC | Facility: CLINIC | Age: 42
End: 2022-04-25

## 2022-04-25 VITALS
DIASTOLIC BLOOD PRESSURE: 50 MMHG | HEIGHT: 64 IN | OXYGEN SATURATION: 99 % | SYSTOLIC BLOOD PRESSURE: 123 MMHG | BODY MASS INDEX: 40.97 KG/M2 | WEIGHT: 240 LBS | HEART RATE: 93 BPM

## 2022-04-25 DIAGNOSIS — G47.00 INSOMNIA, UNSPECIFIED TYPE: ICD-10-CM

## 2022-04-25 DIAGNOSIS — E55.9 VITAMIN D DEFICIENCY: Chronic | ICD-10-CM

## 2022-04-25 DIAGNOSIS — F90.9 ATTENTION DEFICIT HYPERACTIVITY DISORDER (ADHD), UNSPECIFIED ADHD TYPE: Primary | ICD-10-CM

## 2022-04-25 DIAGNOSIS — E53.8 FOLIC ACID DEFICIENCY: ICD-10-CM

## 2022-04-25 DIAGNOSIS — E66.01 CLASS 3 SEVERE OBESITY DUE TO EXCESS CALORIES WITH SERIOUS COMORBIDITY AND BODY MASS INDEX (BMI) OF 40.0 TO 44.9 IN ADULT: Chronic | ICD-10-CM

## 2022-04-25 PROCEDURE — 99214 OFFICE O/P EST MOD 30 MIN: CPT | Performed by: PHYSICIAN ASSISTANT

## 2022-04-25 RX ORDER — ZOLPIDEM TARTRATE 10 MG/1
10 TABLET ORAL NIGHTLY PRN
Qty: 90 TABLET | Refills: 1 | Status: SHIPPED | OUTPATIENT
Start: 2022-04-25

## 2022-04-25 RX ORDER — PLECANATIDE 3 MG/1
TABLET ORAL
Qty: 60 TABLET | Refills: 2 | OUTPATIENT
Start: 2022-04-25

## 2022-04-25 RX ORDER — SEMAGLUTIDE 2.4 MG/.75ML
2.4 INJECTION, SOLUTION SUBCUTANEOUS WEEKLY
Qty: 4 ML | Refills: 11 | Status: SHIPPED | OUTPATIENT
Start: 2022-04-25 | End: 2022-05-25

## 2022-04-25 RX ORDER — ERGOCALCIFEROL 1.25 MG/1
50000 CAPSULE ORAL
Qty: 13 CAPSULE | Refills: 1 | Status: SHIPPED | OUTPATIENT
Start: 2022-04-25 | End: 2022-08-22

## 2022-04-25 RX ORDER — FOLIC ACID 5 MG
5 CAPSULE ORAL DAILY
Qty: 90 EACH | Refills: 1 | Status: SHIPPED | OUTPATIENT
Start: 2022-04-25

## 2022-04-25 NOTE — PROGRESS NOTES
Chief Complaint  Hypothyroidism (4 month follow up) and Depression    Subjective          Audrajovanny Daniel presents to [facility]  History of Present Illness   Pt presents today for 4 month follow up.    Pt states no new issues or concerns to discuss today.    Advised pt of referral to psych for medication management.    Labs 11/16/21  cln schd 5/18/22  mammo schd 5/13/22  andres 3/18/22  UDS 9/23/21    Pt is a former smoker.    Past Medical History:   Diagnosis Date   • ADD (attention deficit disorder) 10/31/2017   • Anxiety disorder 11/17/2016   • Breast implant rupture 2016   • Change in bowel habit    • Chronic idiopathic constipation    • Depression    • Hypothyroidism 08/15/2019   • Insomnia 02/17/2017   • Major depressive disorder 11/17/2016   • MTHFR mutation 06/28/20016   • Obesity 11/17/2016   • Pap smear for cervical cancer screening 08/2020    dr. [id number]   • Pedal edema 03/10/2016   • Serrated adenoma of colon       Family History   Problem Relation Age of Onset   • Liver cancer Other         uncle   • Heart disease Other    • Diabetes Other         grandparents   • Colonic polyp Neg Hx    • Colon cancer Neg Hx       Past Surgical History:   Procedure Laterality Date   • BREAST AUGMENTATION  2006   • BREAST SURGERY  2005   • CARPAL TUNNEL RELEASE     • OTHER SURGICAL HISTORY  2011    D & C    • TRIGGER FINGER RELEASE      thumb   • WISDOM TOOTH EXTRACTION  2002        Current Outpatient Medications:   •  amphetamine-dextroamphetamine (ADDERALL) 20 MG tablet, Take 1 tablet by mouth 2 (Two) Times a Day., Disp: 120 tablet, Rfl: 0  •  aspirin 81 MG EC tablet, Take 81 mg by mouth Daily., Disp: , Rfl:   •  buPROPion SR (WELLBUTRIN SR) 150 MG 12 hr tablet, TAKE 1 TABLET TWICE A DAY, Disp: 120 tablet, Rfl: 2  •  Folic Acid 5 MG capsule, Take 5 mg by mouth Daily., Disp: 90 each, Rfl: 1  •  levothyroxine (SYNTHROID, LEVOTHROID) 88 MCG tablet, Take 1 tablet by mouth Daily.,  "Disp: 60 tablet, Rfl: 6  •  liothyronine (CYTOMEL) 5 MCG tablet, TAKE 2 TABLETS BY MOUTH DAILY, Disp: 60 tablet, Rfl: 1  •  LORazepam (ATIVAN) 0.5 MG tablet, Take 0.5 mg by mouth Every 8 (Eight) Hours As Needed for Anxiety., Disp: , Rfl:   •  metFORMIN (GLUCOPHAGE) 500 MG tablet, TAKE 1 TABLET TWICE DAILY  WITH MEALS, Disp: 120 tablet, Rfl: 2  •  pantoprazole (PROTONIX) 40 MG EC tablet, Take 1 tablet by mouth 2 (Two) Times a Day., Disp: 180 tablet, Rfl: 0  •  Plecanatide (Trulance) 3 MG tablet, Take 1 tablet by mouth Daily., Disp: 90 tablet, Rfl: 3  •  Semaglutide-Weight Management (Wegovy) 2.4 MG/0.75ML solution auto-injector, Inject 2.4 mg under the skin into the appropriate area as directed 1 (One) Time Per Week for 30 days., Disp: 4 mL, Rfl: 11  •  spironolactone (ALDACTONE) 100 MG tablet, TAKE 1 TABLET DAILY, Disp: 60 tablet, Rfl: 2  •  Trintellix 10 MG tablet, TAKE 1 TABLET DAILY, Disp: 90 tablet, Rfl: 1  •  vitamin D (ERGOCALCIFEROL) 1.25 MG (27354 UT) capsule capsule, Take 1 capsule by mouth Every 7 (Seven) Days., Disp: 13 capsule, Rfl: 1  •  zolpidem (AMBIEN) 10 MG tablet, Take 1 tablet by mouth At Night As Needed for Sleep., Disp: 90 tablet, Rfl: 1    Objective     Vital Signs:     /50 (BP Location: Right arm)   Pulse 93   Ht 162.6 cm (64\")   Wt 109 kg (240 lb)   SpO2 99%   BMI 41.20 kg/m²    Estimated body mass index is 41.2 kg/m² as calculated from the following:    Height as of this encounter: 162.6 cm (64\").    Weight as of this encounter: 109 kg (240 lb).     Wt Readings from Last 3 Encounters:   04/25/22 109 kg (240 lb)   03/29/22 108 kg (238 lb 12.8 oz)   01/27/22 111 kg (245 lb)     BP Readings from Last 3 Encounters:   04/25/22 123/50   03/29/22 108/76   01/27/22 109/63     BMI is above normal parameters. Recommendations: exercise counseling/recommendations and nutrition counseling/recommendations     Physical Exam  Vitals and nursing note reviewed.   Constitutional:       Appearance: " Normal appearance. She is obese.   HENT:      Head: Normocephalic and atraumatic.   Cardiovascular:      Rate and Rhythm: Normal rate and regular rhythm.      Heart sounds: Normal heart sounds.   Pulmonary:      Effort: Pulmonary effort is normal.      Breath sounds: Normal breath sounds.   Musculoskeletal:      Cervical back: Neck supple.   Neurological:      Mental Status: She is alert.   Psychiatric:         Mood and Affect: Mood normal.         Behavior: Behavior normal.        Result Review :     Common labs    Common Labsle 8/23/21 11/16/21 11/16/21     1655 1655   Glucose   74   BUN   9   Creatinine   0.64   eGFR Non African Am   102   Sodium   135 (A)   Potassium   4.0   Chloride   102   Calcium   9.8   Albumin   4.60   Total Bilirubin   <0.2   Alkaline Phosphatase   76   AST (SGOT)   8   ALT (SGPT)   8   WBC 8.93 9.66    Hemoglobin 12.4 13.4    Hematocrit 37.7 39.8    Platelets 288 347    (A) Abnormal value            \plain         Patient Care Team:  Everardo Coleman PA as PCP - General (Physician Assistant)  Akil Griffith MD as Consulting Physician (Gastroenterology)           Assessment and Plan      Diagnoses and all orders for this visit:    1. Attention deficit hyperactivity disorder (ADHD), unspecified ADHD type (Primary)  Overview:  Controlled with adderall 20mg BID    Orders:  -     Ambulatory Referral to Psychiatry    2. Folic acid deficiency  -     Folic Acid 5 MG capsule; Take 5 mg by mouth Daily.  Dispense: 90 each; Refill: 1    3. Vitamin D deficiency  Comments:  Stable on Vit D 50,000 IU weekly  Orders:  -     vitamin D (ERGOCALCIFEROL) 1.25 MG (60161 UT) capsule capsule; Take 1 capsule by mouth Every 7 (Seven) Days.  Dispense: 13 capsule; Refill: 1    4. Insomnia, unspecified type  Overview:  Ambien 10mg QHS PRN well controlled    Orders:  -     zolpidem (AMBIEN) 10 MG tablet; Take 1 tablet by mouth At Night As Needed for Sleep.  Dispense: 90 tablet; Refill: 1    5. Class 3 severe  obesity due to excess calories with serious comorbidity and body mass index (BMI) of 40.0 to 44.9 in adult (HCC)  Comments:  Disc diet and exercise  Overview:  Disc diet and exercise    Orders:  -     Semaglutide-Weight Management (Wegovy) 2.4 MG/0.75ML solution auto-injector; Inject 2.4 mg under the skin into the appropriate area as directed 1 (One) Time Per Week for 30 days.  Dispense: 4 mL; Refill: 11     Follow Up     Return in about 6 months (around 10/25/2022).    Patient was given instructions and counseling regarding her condition or for health maintenance advice. Please see specific information pulled into the AVS if appropriate.     I have reviewed information obtained and documented by others and I have confirmed the accuracy of this documented note.    DORY Griffiths

## 2022-05-04 ENCOUNTER — APPOINTMENT (OUTPATIENT)
Dept: MAMMOGRAPHY | Facility: HOSPITAL | Age: 42
End: 2022-05-04

## 2022-05-07 DIAGNOSIS — K58.9 IRRITABLE BOWEL SYNDROME, UNSPECIFIED TYPE: ICD-10-CM

## 2022-05-09 RX ORDER — PLECANATIDE 3 MG/1
1 TABLET ORAL DAILY
Qty: 60 TABLET | Refills: 4 | Status: SHIPPED | OUTPATIENT
Start: 2022-05-09

## 2022-05-13 ENCOUNTER — HOSPITAL ENCOUNTER (OUTPATIENT)
Dept: MAMMOGRAPHY | Facility: HOSPITAL | Age: 42
Discharge: HOME | End: 2022-05-13
Admitting: OBSTETRICS & GYNECOLOGY

## 2022-05-13 DIAGNOSIS — Z12.31 SCREENING MAMMOGRAM FOR BREAST CANCER: ICD-10-CM

## 2022-05-13 PROCEDURE — 77067 SCR MAMMO BI INCL CAD: CPT

## 2022-05-13 PROCEDURE — 77063 BREAST TOMOSYNTHESIS BI: CPT

## 2022-05-16 ENCOUNTER — LAB (OUTPATIENT)
Dept: LAB | Facility: HOSPITAL | Age: 42
End: 2022-05-16

## 2022-05-16 DIAGNOSIS — Z86.010 HX OF ADENOMATOUS COLONIC POLYPS: ICD-10-CM

## 2022-05-16 DIAGNOSIS — R10.13 EPIGASTRIC PAIN: ICD-10-CM

## 2022-05-16 DIAGNOSIS — Z12.11 COLON CANCER SCREENING: ICD-10-CM

## 2022-05-16 DIAGNOSIS — K21.9 GASTROESOPHAGEAL REFLUX DISEASE WITHOUT ESOPHAGITIS: ICD-10-CM

## 2022-05-16 LAB — SARS-COV-2 RNA PNL SPEC NAA+PROBE: NOT DETECTED

## 2022-05-16 PROCEDURE — U0004 COV-19 TEST NON-CDC HGH THRU: HCPCS

## 2022-05-18 ENCOUNTER — HOSPITAL ENCOUNTER (OUTPATIENT)
Facility: SURGERY CENTER | Age: 42
Setting detail: HOSPITAL OUTPATIENT SURGERY
Discharge: HOME | End: 2022-05-18
Attending: INTERNAL MEDICINE | Admitting: INTERNAL MEDICINE

## 2022-05-18 ENCOUNTER — ANESTHESIA EVENT (OUTPATIENT)
Dept: SURGERY | Facility: SURGERY CENTER | Age: 42
End: 2022-05-18

## 2022-05-18 ENCOUNTER — ANESTHESIA (OUTPATIENT)
Dept: SURGERY | Facility: SURGERY CENTER | Age: 42
End: 2022-05-18

## 2022-05-18 VITALS
HEIGHT: 64 IN | DIASTOLIC BLOOD PRESSURE: 66 MMHG | HEART RATE: 88 BPM | TEMPERATURE: 98 F | RESPIRATION RATE: 16 BRPM | WEIGHT: 238.6 LBS | BODY MASS INDEX: 40.74 KG/M2 | SYSTOLIC BLOOD PRESSURE: 111 MMHG | OXYGEN SATURATION: 97 %

## 2022-05-18 DIAGNOSIS — Z12.11 COLON CANCER SCREENING: ICD-10-CM

## 2022-05-18 DIAGNOSIS — Z86.010 HX OF ADENOMATOUS COLONIC POLYPS: ICD-10-CM

## 2022-05-18 DIAGNOSIS — R10.13 EPIGASTRIC PAIN: ICD-10-CM

## 2022-05-18 DIAGNOSIS — K21.9 GASTROESOPHAGEAL REFLUX DISEASE WITHOUT ESOPHAGITIS: ICD-10-CM

## 2022-05-18 LAB
B-HCG UR QL: NEGATIVE
EXPIRATION DATE: NORMAL
INTERNAL NEGATIVE CONTROL: NORMAL
INTERNAL POSITIVE CONTROL: NORMAL
Lab: NORMAL

## 2022-05-18 PROCEDURE — 43239 EGD BIOPSY SINGLE/MULTIPLE: CPT | Performed by: INTERNAL MEDICINE

## 2022-05-18 PROCEDURE — 45385 COLONOSCOPY W/LESION REMOVAL: CPT | Performed by: INTERNAL MEDICINE

## 2022-05-18 PROCEDURE — 81025 URINE PREGNANCY TEST: CPT | Performed by: NURSE PRACTITIONER

## 2022-05-18 PROCEDURE — 88305 TISSUE EXAM BY PATHOLOGIST: CPT | Performed by: INTERNAL MEDICINE

## 2022-05-18 PROCEDURE — 88342 IMHCHEM/IMCYTCHM 1ST ANTB: CPT | Performed by: INTERNAL MEDICINE

## 2022-05-18 PROCEDURE — 25010000002 PROPOFOL 10 MG/ML EMULSION: Performed by: ANESTHESIOLOGY

## 2022-05-18 PROCEDURE — 87081 CULTURE SCREEN ONLY: CPT | Performed by: INTERNAL MEDICINE

## 2022-05-18 RX ORDER — PROPOFOL 10 MG/ML
VIAL (ML) INTRAVENOUS AS NEEDED
Status: DISCONTINUED | OUTPATIENT
Start: 2022-05-18 | End: 2022-05-18 | Stop reason: SURG

## 2022-05-18 RX ORDER — SODIUM CHLORIDE, SODIUM LACTATE, POTASSIUM CHLORIDE, CALCIUM CHLORIDE 600; 310; 30; 20 MG/100ML; MG/100ML; MG/100ML; MG/100ML
30 INJECTION, SOLUTION INTRAVENOUS CONTINUOUS PRN
Status: DISCONTINUED | OUTPATIENT
Start: 2022-05-18 | End: 2022-05-18 | Stop reason: HOSPADM

## 2022-05-18 RX ORDER — SODIUM CHLORIDE 0.9 % (FLUSH) 0.9 %
3 SYRINGE (ML) INJECTION EVERY 12 HOURS SCHEDULED
Status: DISCONTINUED | OUTPATIENT
Start: 2022-05-18 | End: 2022-05-18 | Stop reason: HOSPADM

## 2022-05-18 RX ORDER — SODIUM CHLORIDE, SODIUM LACTATE, POTASSIUM CHLORIDE, CALCIUM CHLORIDE 600; 310; 30; 20 MG/100ML; MG/100ML; MG/100ML; MG/100ML
INJECTION, SOLUTION INTRAVENOUS CONTINUOUS PRN
Status: DISCONTINUED | OUTPATIENT
Start: 2022-05-18 | End: 2022-05-18 | Stop reason: SURG

## 2022-05-18 RX ORDER — LIDOCAINE HYDROCHLORIDE 20 MG/ML
INJECTION, SOLUTION INFILTRATION; PERINEURAL AS NEEDED
Status: DISCONTINUED | OUTPATIENT
Start: 2022-05-18 | End: 2022-05-18 | Stop reason: SURG

## 2022-05-18 RX ORDER — SODIUM CHLORIDE 0.9 % (FLUSH) 0.9 %
10 SYRINGE (ML) INJECTION AS NEEDED
Status: DISCONTINUED | OUTPATIENT
Start: 2022-05-18 | End: 2022-05-18 | Stop reason: HOSPADM

## 2022-05-18 RX ORDER — MAGNESIUM HYDROXIDE 1200 MG/15ML
LIQUID ORAL AS NEEDED
Status: DISCONTINUED | OUTPATIENT
Start: 2022-05-18 | End: 2022-05-18 | Stop reason: HOSPADM

## 2022-05-18 RX ADMIN — GLYCOPYRROLATE 0.2 MG: 0.2 INJECTION, SOLUTION INTRAMUSCULAR; INTRAVITREAL at 11:30

## 2022-05-18 RX ADMIN — PROPOFOL 150 MG: 10 INJECTION, EMULSION INTRAVENOUS at 11:31

## 2022-05-18 RX ADMIN — PROPOFOL INJECTABLE EMULSION 250 MCG/KG/MIN: 10 INJECTION, EMULSION INTRAVENOUS at 11:31

## 2022-05-18 RX ADMIN — SODIUM CHLORIDE, SODIUM LACTATE, POTASSIUM CHLORIDE, AND CALCIUM CHLORIDE: .6; .31; .03; .02 INJECTION, SOLUTION INTRAVENOUS at 11:27

## 2022-05-18 RX ADMIN — LIDOCAINE HYDROCHLORIDE 60 MG: 20 INJECTION, SOLUTION INFILTRATION; PERINEURAL at 11:30

## 2022-05-18 RX ADMIN — SODIUM CHLORIDE, POTASSIUM CHLORIDE, SODIUM LACTATE AND CALCIUM CHLORIDE 30 ML/HR: 600; 310; 30; 20 INJECTION, SOLUTION INTRAVENOUS at 11:22

## 2022-05-18 NOTE — ANESTHESIA PREPROCEDURE EVALUATION
Anesthesia Evaluation     NPO Solid Status: > 8 hours  NPO Liquid Status: > 2 hours           Airway   Mallampati: II  TM distance: >3 FB  Neck ROM: full  Dental - normal exam     Pulmonary - negative pulmonary ROS and normal exam   Cardiovascular - normal exam  Exercise tolerance: good (4-7 METS)    (-) hypertension, past MI, angina      Neuro/Psych  (+) psychiatric history Anxiety, Depression and ADHD,    GI/Hepatic/Renal/Endo    (+) morbid obesity, GERD,  thyroid problem hypothyroidism    Musculoskeletal     Abdominal    Substance History      OB/GYN          Other                        Anesthesia Plan    ASA 2     MAC     intravenous induction     Anesthetic plan, all risks, benefits, and alternatives have been provided, discussed and informed consent has been obtained with: patient.        CODE STATUS:

## 2022-05-18 NOTE — ANESTHESIA POSTPROCEDURE EVALUATION
"Patient: Audra Daniel    Procedure Summary     Date: 05/18/22 Room / Location: [facility]    Anesthesia Start: 1127 Anesthesia Stop: 1207    Procedures:       COLONOSCOPY (N/A )      ESOPHAGOGASTRODUODENOSCOPY (N/A ) Diagnosis:       Gastroesophageal reflux disease without esophagitis      Epigastric pain      Hx of adenomatous colonic polyps      Colon cancer screening      (Gastroesophageal reflux disease without esophagitis [K21.9])      (Epigastric pain [R10.13])      (Hx of adenomatous colonic polyps [Z86.010])      (Colon cancer screening [Z12.11])    Surgeons: Akil Griffith MD Provider: Christina Ureña MD    Anesthesia Type: MAC ASA Status: 2          Anesthesia Type: MAC    Vitals  Vitals Value Taken Time   /79 05/18/22 1218   Temp 36.7 °C (98 °F) 05/18/22 1205   Pulse 98 05/18/22 1218   Resp 16 05/18/22 1218   SpO2 94 % 05/18/22 1218           Post Anesthesia Care and Evaluation    Patient location during evaluation: bedside  Patient participation: complete - patient participated  Level of consciousness: awake  Pain management: adequate  Airway patency: patent  Anesthetic complications: No anesthetic complications    Cardiovascular status: acceptable  Respiratory status: acceptable  Hydration status: acceptable    Comments: /79 (BP Location: Left arm, Patient Position: Lying)   Pulse 98   Temp 36.7 °C (98 °F) (Infrared)   Resp 16   Ht 162.6 cm (64\")   Wt 108 kg (238 lb 9.6 oz)   LMP 04/25/2022 (Exact Date)   SpO2 94%   BMI 40.96 kg/m²       "

## 2022-05-18 NOTE — H&P
No chief complaint on file.      HPI  Patient here today for a colonoscopy for screening with a personal history of adenomatous polyp and also for an EGD due to GERD and epigastric pain.         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)   • Hypothyroidism   • Insomnia   • Class 3 severe obesity due to excess calories with serious comorbidity and body mass index (BMI) of 40.0 to 44.9 in adult (HCC)   • Gastroesophageal reflux disease with esophagitis without hemorrhage   • Folic acid deficiency   • Major depressive disorder, single episode, moderate (HCC)   • Gastroesophageal reflux disease without esophagitis   • Epigastric pain   • Hx of adenomatous colonic polyps   • Colon cancer screening   • Vitamin D deficiency       Medical History:    Past Medical History:   Diagnosis Date   • ADD (attention deficit disorder) 10/31/2017   • Anxiety disorder 2016   • Breast implant rupture    • Change in bowel habit    • Chronic idiopathic constipation    • Depression    • Hypothyroidism 08/15/2019   • Insomnia 2017   • Major depressive disorder 2016   • MTHFR mutation    • Obesity 2016   • Pap smear for cervical cancer screening 2020    dr. [id number]-gyn   • Pedal edema 03/10/2016   • Serrated adenoma of colon         Social History:    Social History     Socioeconomic History   • Marital status:    Tobacco Use   • Smoking status: Former Smoker     Years: 10.00     Quit date: 2011     Years since quittin.0   • Smokeless tobacco: Never Used   Vaping Use   • Vaping Use: Never used   Substance and Sexual Activity   • Alcohol use: Yes     Alcohol/week: 1.0 standard drink     Types: 1 Glasses of wine per week     Comment: social awareness   • Drug use: Never   • Sexual activity:  Defer       Family History:   Family History   Problem Relation Age of Onset   • Liver cancer Other         uncle   • Heart disease Other    • Diabetes Other         grandparents   • Colonic polyp Neg Hx    • Colon cancer Neg Hx        Surgical History:   Past Surgical History:   Procedure Laterality Date   • BREAST AUGMENTATION  2006   • BREAST SURGERY  2005   • CARPAL TUNNEL RELEASE     • OTHER SURGICAL HISTORY  2011    [facility]    • TRIGGER FINGER RELEASE      thumb   • WISDOM TOOTH EXTRACTION  2002         Current Facility-Administered Medications:   •  lactated ringers infusion, 30 mL/hr, Intravenous, Continuous PRN, Ad, Shreya, APRN  •  sodium chloride 0.9 % flush 10 mL, 10 mL, Intravenous, PRN, Ad, Shreya, APRN  •  sodium chloride 0.9 % flush 3 mL, 3 mL, Intravenous, Q12H, Ad, Shreya, APRN    Allergies:   Allergies   Allergen Reactions   • Codeine Anaphylaxis        The following portions of the patient's history were reviewed by me and updated as appropriate: review of systems, allergies, current medications, past family history, past medical history, past social history, past surgical history and problem list.    There were no vitals filed for this visit.    PHYSICAL EXAM:    CONSTITUTIONAL:  today's vital signs reviewed by me  GASTROINTESTINAL: abdomen is soft nontender nondistended with normal active bowel sounds, no masses are appreciated    Assessment/ Plan  We will proceed today with EGD and colonoscopy.    Risks and benefits as well as alternatives to endoscopic evaluation were explained to the patient and they voiced understanding and wish to proceed.  These risks include but are not limited to the risk of bleeding, perforation, adverse reaction to sedation, and missed lesions.  The patient was given the opportunity to ask questions prior to the endoscopic procedure.

## 2022-05-20 ENCOUNTER — TELEPHONE (OUTPATIENT)
Dept: FAMILY MEDICINE CLINIC | Facility: CLINIC | Age: 42
End: 2022-05-20

## 2022-05-20 LAB
LAB AP CASE REPORT: NORMAL
LAB AP CLINICAL INFORMATION: NORMAL
LAB AP DIAGNOSIS COMMENT: NORMAL
PATH REPORT.ADDENDUM SPEC: NORMAL
PATH REPORT.FINAL DX SPEC: NORMAL
PATH REPORT.GROSS SPEC: NORMAL
UREASE TISS QL: NEGATIVE

## 2022-06-07 DIAGNOSIS — E03.9 HYPOTHYROIDISM, UNSPECIFIED TYPE: ICD-10-CM

## 2022-06-07 RX ORDER — LIOTHYRONINE SODIUM 5 UG/1
TABLET ORAL
Qty: 120 TABLET | Refills: 2 | Status: SHIPPED | OUTPATIENT
Start: 2022-06-07

## 2022-08-11 DIAGNOSIS — F41.9 ANXIETY: ICD-10-CM

## 2022-08-11 DIAGNOSIS — F32.A DEPRESSION, UNSPECIFIED DEPRESSION TYPE: ICD-10-CM

## 2022-08-11 RX ORDER — VORTIOXETINE 10 MG/1
TABLET, FILM COATED ORAL
Qty: 60 TABLET | Refills: 2 | Status: SHIPPED | OUTPATIENT
Start: 2022-08-11

## 2022-08-21 DIAGNOSIS — E55.9 VITAMIN D DEFICIENCY: Chronic | ICD-10-CM

## 2022-08-22 RX ORDER — ERGOCALCIFEROL 1.25 MG/1
CAPSULE ORAL
Qty: 13 CAPSULE | Refills: 1 | Status: SHIPPED | OUTPATIENT
Start: 2022-08-22

## 2022-08-27 DIAGNOSIS — R73.9 HYPERGLYCEMIA: ICD-10-CM

## 2022-10-16 DIAGNOSIS — G47.00 INSOMNIA, UNSPECIFIED TYPE: ICD-10-CM

## 2022-10-18 NOTE — TELEPHONE ENCOUNTER
Hub to read    Phoned patient to see which provider they are wanting to see? Needs an appointment set up so we can send over refill request.

## 2022-10-19 RX ORDER — ZOLPIDEM TARTRATE 10 MG/1
TABLET ORAL
Qty: 60 TABLET | Refills: 2 | OUTPATIENT
Start: 2022-10-19

## 2023-01-30 DIAGNOSIS — E53.8 FOLIC ACID DEFICIENCY: ICD-10-CM

## 2023-01-30 NOTE — TELEPHONE ENCOUNTER
Caller: [facility]    Relationship: PHARMACY    Best call back number: [phone]  REF# [id number]    Requested Prescriptions:   Requested Prescriptions     Pending Prescriptions Disp Refills   • Folic Acid 5 MG capsule 90 each 1     Sig: Take 5 mg by mouth Daily.      I DO NOT THINK PATIENT HAS BEEN RE-ESTABLISHED WITH A NEW PROVIDER. IF SO, PLEASE DISREGARD.     Pharmacy where request should be sent: [facility] - [place], [place] - [facility] [place] [facility] [phone] [facility] [phone] FX     Does the patient have less than a 3 day supply:  [] Yes  [x] No    Would you like a call back once the refill request has been completed: [] Yes [x] No    If the office needs to give you a call back, can they leave a voicemail: [] Yes [x] No    Kim Wood Rep   01/30/23 08:53 EST

## 2023-01-30 NOTE — TELEPHONE ENCOUNTER
Phoned patient to see which provider she would like to get established with.     OK for the HUB to read.

## 2023-01-31 RX ORDER — FOLIC ACID 5 MG
5 CAPSULE ORAL DAILY
Qty: 90 EACH | Refills: 1 | OUTPATIENT
Start: 2023-01-31

## 2023-03-15 ENCOUNTER — TRANSCRIBE ORDERS (OUTPATIENT)
Dept: ADMINISTRATIVE | Facility: HOSPITAL | Age: 43
End: 2023-03-15
Payer: COMMERCIAL

## 2023-03-15 DIAGNOSIS — Z12.31 ENCOUNTER FOR SCREENING MAMMOGRAM FOR BREAST CANCER: Primary | ICD-10-CM

## 2023-06-16 ENCOUNTER — HOSPITAL ENCOUNTER (OUTPATIENT)
Dept: MAMMOGRAPHY | Facility: HOSPITAL | Age: 43
Discharge: HOME | End: 2023-06-16
Admitting: NURSE PRACTITIONER
Payer: COMMERCIAL

## 2023-06-16 DIAGNOSIS — Z12.31 ENCOUNTER FOR SCREENING MAMMOGRAM FOR BREAST CANCER: ICD-10-CM

## 2023-06-16 PROCEDURE — 77063 BREAST TOMOSYNTHESIS BI: CPT

## 2023-06-16 PROCEDURE — 77067 SCR MAMMO BI INCL CAD: CPT

## 2023-10-30 ENCOUNTER — PREP FOR SURGERY (OUTPATIENT)
Dept: OTHER | Facility: HOSPITAL | Age: 43
End: 2023-10-30
Payer: COMMERCIAL

## 2023-10-30 DIAGNOSIS — N92.4 EXCESSIVE BLEEDING IN PREMENOPAUSAL PERIOD: Primary | ICD-10-CM

## 2023-10-31 ENCOUNTER — TRANSCRIBE ORDERS (OUTPATIENT)
Dept: ADMINISTRATIVE | Facility: HOSPITAL | Age: 43
End: 2023-10-31
Payer: COMMERCIAL

## 2023-10-31 DIAGNOSIS — N92.1 MENORRHAGIA WITH IRREGULAR CYCLE: Primary | ICD-10-CM

## 2023-11-07 ENCOUNTER — HOSPITAL ENCOUNTER (OUTPATIENT)
Dept: ULTRASOUND IMAGING | Facility: HOSPITAL | Age: 43
Discharge: HOME | End: 2023-11-07
Admitting: OBSTETRICS & GYNECOLOGY
Payer: COMMERCIAL

## 2023-11-07 DIAGNOSIS — N92.1 MENORRHAGIA WITH IRREGULAR CYCLE: ICD-10-CM

## 2023-11-07 PROCEDURE — 76830 TRANSVAGINAL US NON-OB: CPT

## 2023-11-14 PROCEDURE — 88305 TISSUE EXAM BY PATHOLOGIST: CPT | Performed by: OBSTETRICS & GYNECOLOGY

## 2023-11-15 ENCOUNTER — LAB REQUISITION (OUTPATIENT)
Dept: LAB | Facility: HOSPITAL | Age: 43
End: 2023-11-15
Payer: COMMERCIAL

## 2023-11-15 DIAGNOSIS — N92.0 EXCESSIVE AND FREQUENT MENSTRUATION WITH REGULAR CYCLE: ICD-10-CM

## 2023-11-16 LAB
CYTO UR: NORMAL
LAB AP CASE REPORT: NORMAL
LAB AP CLINICAL INFORMATION: NORMAL
PATH REPORT.FINAL DX SPEC: NORMAL
PATH REPORT.GROSS SPEC: NORMAL

## 2023-11-30 ENCOUNTER — TRANSCRIBE ORDERS (OUTPATIENT)
Dept: LAB | Facility: HOSPITAL | Age: 43
End: 2023-11-30
Payer: COMMERCIAL

## 2023-11-30 ENCOUNTER — LAB (OUTPATIENT)
Dept: LAB | Facility: HOSPITAL | Age: 43
End: 2023-11-30
Payer: COMMERCIAL

## 2023-11-30 DIAGNOSIS — G47.00 INSOMNIA, UNSPECIFIED TYPE: ICD-10-CM

## 2023-11-30 DIAGNOSIS — E28.2 POLYCYSTIC OVARIAN SYNDROME: ICD-10-CM

## 2023-11-30 DIAGNOSIS — F90.0 ADHD, PREDOMINANTLY INATTENTIVE TYPE: ICD-10-CM

## 2023-11-30 DIAGNOSIS — F41.0 PANIC DISORDER: ICD-10-CM

## 2023-11-30 DIAGNOSIS — N94.3 PREMENSTRUAL TENSION SYNDROME: ICD-10-CM

## 2023-11-30 DIAGNOSIS — N94.3 PREMENSTRUAL TENSION SYNDROME: Primary | ICD-10-CM

## 2023-11-30 LAB
25(OH)D3 SERPL-MCNC: 35.3 NG/ML (ref 30–100)
ALBUMIN SERPL-MCNC: 4.3 G/DL (ref 3.5–5.2)
ALBUMIN/GLOB SERPL: 1.8 G/DL
ALP SERPL-CCNC: 75 U/L (ref 39–117)
ALT SERPL W P-5'-P-CCNC: 46 U/L (ref 1–33)
ANION GAP SERPL CALCULATED.3IONS-SCNC: 11.4 MMOL/L (ref 5–15)
AST SERPL-CCNC: 23 U/L (ref 1–32)
BILIRUB SERPL-MCNC: <0.2 MG/DL (ref 0–1.2)
BUN SERPL-MCNC: 11 MG/DL (ref 6–20)
BUN/CREAT SERPL: 13.4 (ref 7–25)
CALCIUM SPEC-SCNC: 9.3 MG/DL (ref 8.6–10.5)
CHLORIDE SERPL-SCNC: 107 MMOL/L (ref 98–107)
CO2 SERPL-SCNC: 22.6 MMOL/L (ref 22–29)
CREAT SERPL-MCNC: 0.82 MG/DL (ref 0.57–1)
EGFRCR SERPLBLD CKD-EPI 2021: 91.2 ML/MIN/1.73
FSH SERPL-ACNC: 3.49 MIU/ML
GLOBULIN UR ELPH-MCNC: 2.4 GM/DL
GLUCOSE SERPL-MCNC: 95 MG/DL (ref 65–99)
HBA1C MFR BLD: 5.4 % (ref 4.8–5.6)
LH SERPL-ACNC: 6.81 MIU/ML
POTASSIUM SERPL-SCNC: 4.2 MMOL/L (ref 3.5–5.2)
PROGEST SERPL-MCNC: 0.51 NG/ML
PROT SERPL-MCNC: 6.7 G/DL (ref 6–8.5)
SODIUM SERPL-SCNC: 141 MMOL/L (ref 136–145)
T3FREE SERPL-MCNC: 2.55 PG/ML (ref 2–4.4)
T4 FREE SERPL-MCNC: 0.71 NG/DL (ref 0.93–1.7)
TSH SERPL DL<=0.05 MIU/L-ACNC: 2.57 UIU/ML (ref 0.27–4.2)
VIT B12 BLD-MCNC: 1143 PG/ML (ref 211–946)

## 2023-11-30 PROCEDURE — 84443 ASSAY THYROID STIM HORMONE: CPT

## 2023-11-30 PROCEDURE — 84439 ASSAY OF FREE THYROXINE: CPT

## 2023-11-30 PROCEDURE — 82306 VITAMIN D 25 HYDROXY: CPT

## 2023-11-30 PROCEDURE — 80053 COMPREHEN METABOLIC PANEL: CPT

## 2023-11-30 PROCEDURE — 84481 FREE ASSAY (FT-3): CPT

## 2023-11-30 PROCEDURE — 36415 COLL VENOUS BLD VENIPUNCTURE: CPT

## 2023-11-30 PROCEDURE — 86376 MICROSOMAL ANTIBODY EACH: CPT

## 2023-11-30 PROCEDURE — 86800 THYROGLOBULIN ANTIBODY: CPT

## 2023-11-30 PROCEDURE — 83002 ASSAY OF GONADOTROPIN (LH): CPT

## 2023-11-30 PROCEDURE — 83001 ASSAY OF GONADOTROPIN (FSH): CPT

## 2023-11-30 PROCEDURE — 82672 ASSAY OF ESTROGEN: CPT

## 2023-11-30 PROCEDURE — 84144 ASSAY OF PROGESTERONE: CPT

## 2023-11-30 PROCEDURE — 82607 VITAMIN B-12: CPT

## 2023-11-30 PROCEDURE — 83036 HEMOGLOBIN GLYCOSYLATED A1C: CPT

## 2023-12-01 LAB
THYROGLOB AB SERPL-ACNC: <1 IU/ML (ref 0–0.9)
THYROPEROXIDASE AB SERPL-ACNC: 11 IU/ML (ref 0–34)

## 2023-12-02 LAB — ESTROGEN SERPL-MCNC: 450 PG/ML

## 2024-07-17 ENCOUNTER — TRANSCRIBE ORDERS (OUTPATIENT)
Dept: ADMINISTRATIVE | Facility: HOSPITAL | Age: 44
End: 2024-07-17
Payer: COMMERCIAL

## 2024-07-17 DIAGNOSIS — Z12.31 SCREENING MAMMOGRAM FOR HIGH-RISK PATIENT: Primary | ICD-10-CM

## 2024-07-30 ENCOUNTER — TRANSCRIBE ORDERS (OUTPATIENT)
Dept: ADMINISTRATIVE | Facility: HOSPITAL | Age: 44
End: 2024-07-30
Payer: COMMERCIAL

## 2024-07-30 DIAGNOSIS — Z12.31 SCREENING MAMMOGRAM, ENCOUNTER FOR: Primary | ICD-10-CM

## 2024-08-20 ENCOUNTER — LAB (OUTPATIENT)
Dept: LAB | Facility: HOSPITAL | Age: 44
End: 2024-08-20
Payer: COMMERCIAL

## 2024-08-20 ENCOUNTER — TRANSCRIBE ORDERS (OUTPATIENT)
Dept: LAB | Facility: HOSPITAL | Age: 44
End: 2024-08-20
Payer: COMMERCIAL

## 2024-08-20 DIAGNOSIS — Z01.89 LABORATORY PROCEDURE: ICD-10-CM

## 2024-08-20 DIAGNOSIS — Z79.899 ENCOUNTER FOR LONG-TERM (CURRENT) USE OF MEDICATIONS: ICD-10-CM

## 2024-08-20 DIAGNOSIS — Z00.00 ROUTINE MEDICAL EXAM: Primary | ICD-10-CM

## 2024-08-20 DIAGNOSIS — E03.9 HYPOTHYROIDISM, UNSPECIFIED TYPE: ICD-10-CM

## 2024-08-20 DIAGNOSIS — Z00.00 ROUTINE MEDICAL EXAM: ICD-10-CM

## 2024-08-20 LAB
25(OH)D3 SERPL-MCNC: 28.3 NG/ML (ref 30–100)
BASOPHILS # BLD AUTO: 0.05 10*3/MM3 (ref 0–0.2)
BASOPHILS NFR BLD AUTO: 0.4 % (ref 0–1.5)
CHOLEST SERPL-MCNC: 164 MG/DL (ref 0–200)
DEPRECATED RDW RBC AUTO: 44.1 FL (ref 37–54)
EOSINOPHIL # BLD AUTO: 0.22 10*3/MM3 (ref 0–0.4)
EOSINOPHIL NFR BLD AUTO: 1.9 % (ref 0.3–6.2)
ERYTHROCYTE [DISTWIDTH] IN BLOOD BY AUTOMATED COUNT: 13 % (ref 12.3–15.4)
FERRITIN SERPL-MCNC: 73.1 NG/ML (ref 13–150)
FOLATE SERPL-MCNC: >20 NG/ML (ref 4.78–24.2)
HBA1C MFR BLD: 5.4 % (ref 4.8–5.6)
HCT VFR BLD AUTO: 39.6 % (ref 34–46.6)
HDLC SERPL-MCNC: 49 MG/DL (ref 40–60)
HGB BLD-MCNC: 13.2 G/DL (ref 12–15.9)
IMM GRANULOCYTES # BLD AUTO: 0.03 10*3/MM3 (ref 0–0.05)
IMM GRANULOCYTES NFR BLD AUTO: 0.3 % (ref 0–0.5)
IRON 24H UR-MRATE: 87 MCG/DL (ref 37–145)
IRON SATN MFR SERPL: 24 % (ref 20–50)
LDLC SERPL CALC-MCNC: 101 MG/DL (ref 0–100)
LDLC/HDLC SERPL: 2.04 {RATIO}
LYMPHOCYTES # BLD AUTO: 2.29 10*3/MM3 (ref 0.7–3.1)
LYMPHOCYTES NFR BLD AUTO: 19.9 % (ref 19.6–45.3)
MCH RBC QN AUTO: 31.5 PG (ref 26.6–33)
MCHC RBC AUTO-ENTMCNC: 33.3 G/DL (ref 31.5–35.7)
MCV RBC AUTO: 94.5 FL (ref 79–97)
MONOCYTES # BLD AUTO: 0.91 10*3/MM3 (ref 0.1–0.9)
MONOCYTES NFR BLD AUTO: 7.9 % (ref 5–12)
NEUTROPHILS NFR BLD AUTO: 69.6 % (ref 42.7–76)
NEUTROPHILS NFR BLD AUTO: 8.01 10*3/MM3 (ref 1.7–7)
NRBC BLD AUTO-RTO: 0 /100 WBC (ref 0–0.2)
PLATELET # BLD AUTO: 361 10*3/MM3 (ref 140–450)
PMV BLD AUTO: 9.2 FL (ref 6–12)
RBC # BLD AUTO: 4.19 10*6/MM3 (ref 3.77–5.28)
T3FREE SERPL-MCNC: 2.49 PG/ML (ref 2–4.4)
T4 FREE SERPL-MCNC: 1.09 NG/DL (ref 0.92–1.68)
TIBC SERPL-MCNC: 356 MCG/DL (ref 298–536)
TRANSFERRIN SERPL-MCNC: 239 MG/DL (ref 200–360)
TRIGL SERPL-MCNC: 74 MG/DL (ref 0–150)
TSH SERPL DL<=0.05 MIU/L-ACNC: 0.36 UIU/ML (ref 0.27–4.2)
VIT B12 BLD-MCNC: 835 PG/ML (ref 211–946)
VLDLC SERPL-MCNC: 14 MG/DL (ref 5–40)
WBC NRBC COR # BLD AUTO: 11.51 10*3/MM3 (ref 3.4–10.8)

## 2024-08-20 PROCEDURE — 36415 COLL VENOUS BLD VENIPUNCTURE: CPT

## 2024-08-20 PROCEDURE — 83036 HEMOGLOBIN GLYCOSYLATED A1C: CPT

## 2024-08-20 PROCEDURE — 82728 ASSAY OF FERRITIN: CPT

## 2024-08-20 PROCEDURE — 82746 ASSAY OF FOLIC ACID SERUM: CPT

## 2024-08-20 PROCEDURE — 84439 ASSAY OF FREE THYROXINE: CPT

## 2024-08-20 PROCEDURE — 80061 LIPID PANEL: CPT

## 2024-08-20 PROCEDURE — 82306 VITAMIN D 25 HYDROXY: CPT

## 2024-08-20 PROCEDURE — 83540 ASSAY OF IRON: CPT

## 2024-08-20 PROCEDURE — 82607 VITAMIN B-12: CPT

## 2024-08-20 PROCEDURE — 84481 FREE ASSAY (FT-3): CPT

## 2024-08-20 PROCEDURE — 80050 GENERAL HEALTH PANEL: CPT

## 2024-08-20 PROCEDURE — 84466 ASSAY OF TRANSFERRIN: CPT

## 2024-08-21 LAB
ALBUMIN SERPL-MCNC: 4.3 G/DL (ref 3.5–5.2)
ALBUMIN/GLOB SERPL: 1.7 G/DL
ALP SERPL-CCNC: 70 U/L (ref 39–117)
ALT SERPL W P-5'-P-CCNC: 6 U/L (ref 1–33)
ANION GAP SERPL CALCULATED.3IONS-SCNC: 13 MMOL/L (ref 5–15)
AST SERPL-CCNC: 13 U/L (ref 1–32)
BILIRUB SERPL-MCNC: 0.2 MG/DL (ref 0–1.2)
BUN SERPL-MCNC: 7 MG/DL (ref 6–20)
BUN/CREAT SERPL: 9.6 (ref 7–25)
CALCIUM SPEC-SCNC: 9.6 MG/DL (ref 8.6–10.5)
CHLORIDE SERPL-SCNC: 103 MMOL/L (ref 98–107)
CO2 SERPL-SCNC: 22 MMOL/L (ref 22–29)
CREAT SERPL-MCNC: 0.73 MG/DL (ref 0.57–1)
EGFRCR SERPLBLD CKD-EPI 2021: 104.1 ML/MIN/1.73
GLOBULIN UR ELPH-MCNC: 2.5 GM/DL
GLUCOSE SERPL-MCNC: 79 MG/DL (ref 65–99)
POTASSIUM SERPL-SCNC: 4.2 MMOL/L (ref 3.5–5.2)
PROT SERPL-MCNC: 6.8 G/DL (ref 6–8.5)
SODIUM SERPL-SCNC: 138 MMOL/L (ref 136–145)

## 2024-09-24 ENCOUNTER — TELEPHONE (OUTPATIENT)
Dept: GASTROENTEROLOGY | Facility: CLINIC | Age: 44
End: 2024-09-24
Payer: COMMERCIAL

## 2024-10-02 ENCOUNTER — HOSPITAL ENCOUNTER (OUTPATIENT)
Dept: MAMMOGRAPHY | Facility: HOSPITAL | Age: 44
Discharge: HOME | End: 2024-10-02
Admitting: NURSE PRACTITIONER
Payer: COMMERCIAL

## 2024-10-02 DIAGNOSIS — Z12.31 ENCOUNTER FOR SCREENING MAMMOGRAM FOR HIGH-RISK PATIENT: ICD-10-CM

## 2024-10-02 PROCEDURE — 77063 BREAST TOMOSYNTHESIS BI: CPT

## 2024-10-02 PROCEDURE — 77067 SCR MAMMO BI INCL CAD: CPT

## 2024-10-07 ENCOUNTER — TRANSCRIBE ORDERS (OUTPATIENT)
Dept: ADMINISTRATIVE | Facility: HOSPITAL | Age: 44
End: 2024-10-07
Payer: COMMERCIAL

## 2024-10-07 DIAGNOSIS — R92.8 ABNORMAL MAMMOGRAM: Primary | ICD-10-CM

## 2024-10-17 ENCOUNTER — HOSPITAL ENCOUNTER (OUTPATIENT)
Dept: MAMMOGRAPHY | Facility: HOSPITAL | Age: 44
Discharge: HOME | End: 2024-10-17
Payer: COMMERCIAL

## 2024-10-17 ENCOUNTER — HOSPITAL ENCOUNTER (OUTPATIENT)
Dept: ULTRASOUND IMAGING | Facility: HOSPITAL | Age: 44
Discharge: HOME | End: 2024-10-17
Payer: COMMERCIAL

## 2024-10-17 DIAGNOSIS — R92.8 ABNORMAL MAMMOGRAM: ICD-10-CM

## 2024-10-17 PROCEDURE — G0279 TOMOSYNTHESIS, MAMMO: HCPCS

## 2024-10-17 PROCEDURE — 77066 DX MAMMO INCL CAD BI: CPT

## 2024-10-17 PROCEDURE — 76642 ULTRASOUND BREAST LIMITED: CPT

## 2025-01-29 ENCOUNTER — TRANSCRIBE ORDERS (OUTPATIENT)
Dept: LAB | Facility: HOSPITAL | Age: 45
End: 2025-01-29
Payer: COMMERCIAL

## 2025-01-29 ENCOUNTER — LAB (OUTPATIENT)
Dept: LAB | Facility: HOSPITAL | Age: 45
End: 2025-01-29
Payer: COMMERCIAL

## 2025-01-29 DIAGNOSIS — Z79.899 ENCOUNTER FOR LONG-TERM (CURRENT) USE OF MEDICATIONS: ICD-10-CM

## 2025-01-29 DIAGNOSIS — E66.9 OBESITY, UNSPECIFIED CLASS, UNSPECIFIED OBESITY TYPE, UNSPECIFIED WHETHER SERIOUS COMORBIDITY PRESENT: Primary | ICD-10-CM

## 2025-01-29 DIAGNOSIS — E66.9 OBESITY, UNSPECIFIED CLASS, UNSPECIFIED OBESITY TYPE, UNSPECIFIED WHETHER SERIOUS COMORBIDITY PRESENT: ICD-10-CM

## 2025-01-29 LAB
ALBUMIN SERPL-MCNC: 3.9 G/DL (ref 3.5–5.2)
ALBUMIN/GLOB SERPL: 1.4 G/DL
ALP SERPL-CCNC: 55 U/L (ref 39–117)
ALT SERPL W P-5'-P-CCNC: 13 U/L (ref 1–33)
ANION GAP SERPL CALCULATED.3IONS-SCNC: 12.7 MMOL/L (ref 5–15)
AST SERPL-CCNC: 13 U/L (ref 1–32)
BILIRUB SERPL-MCNC: 0.3 MG/DL (ref 0–1.2)
BUN SERPL-MCNC: 9 MG/DL (ref 6–20)
BUN/CREAT SERPL: 11.8 (ref 7–25)
CALCIUM SPEC-SCNC: 9.1 MG/DL (ref 8.6–10.5)
CHLORIDE SERPL-SCNC: 104 MMOL/L (ref 98–107)
CHOLEST SERPL-MCNC: 148 MG/DL (ref 0–200)
CO2 SERPL-SCNC: 21.3 MMOL/L (ref 22–29)
CREAT SERPL-MCNC: 0.76 MG/DL (ref 0.57–1)
EGFRCR SERPLBLD CKD-EPI 2021: 99.2 ML/MIN/1.73
GLOBULIN UR ELPH-MCNC: 2.7 GM/DL
GLUCOSE SERPL-MCNC: 73 MG/DL (ref 65–99)
HBA1C MFR BLD: 4.9 % (ref 4.8–5.6)
HDLC SERPL-MCNC: 56 MG/DL (ref 40–60)
LDLC SERPL CALC-MCNC: 79 MG/DL (ref 0–100)
LDLC/HDLC SERPL: 1.41 {RATIO}
POTASSIUM SERPL-SCNC: 4.3 MMOL/L (ref 3.5–5.2)
PROT SERPL-MCNC: 6.6 G/DL (ref 6–8.5)
SODIUM SERPL-SCNC: 138 MMOL/L (ref 136–145)
TRIGL SERPL-MCNC: 66 MG/DL (ref 0–150)
VLDLC SERPL-MCNC: 13 MG/DL (ref 5–40)

## 2025-01-29 PROCEDURE — 83036 HEMOGLOBIN GLYCOSYLATED A1C: CPT

## 2025-01-29 PROCEDURE — 80061 LIPID PANEL: CPT

## 2025-01-29 PROCEDURE — 36415 COLL VENOUS BLD VENIPUNCTURE: CPT

## 2025-01-29 PROCEDURE — 80053 COMPREHEN METABOLIC PANEL: CPT

## 2025-06-25 ENCOUNTER — TRANSCRIBE ORDERS (OUTPATIENT)
Dept: ADMINISTRATIVE | Facility: HOSPITAL | Age: 45
End: 2025-06-25
Payer: COMMERCIAL

## 2025-06-25 DIAGNOSIS — Z12.31 ENCOUNTER FOR SCREENING MAMMOGRAM FOR MALIGNANT NEOPLASM OF BREAST: Primary | ICD-10-CM

## 2025-06-30 ENCOUNTER — TRANSCRIBE ORDERS (OUTPATIENT)
Dept: ADMINISTRATIVE | Facility: HOSPITAL | Age: 45
End: 2025-06-30
Payer: COMMERCIAL

## 2025-06-30 ENCOUNTER — LAB (OUTPATIENT)
Dept: LAB | Facility: HOSPITAL | Age: 45
End: 2025-06-30
Payer: COMMERCIAL

## 2025-06-30 DIAGNOSIS — Z79.899 ENCOUNTER FOR LONG-TERM (CURRENT) USE OF MEDICATIONS: ICD-10-CM

## 2025-06-30 DIAGNOSIS — Z79.899 ENCOUNTER FOR LONG-TERM (CURRENT) USE OF MEDICATIONS: Primary | ICD-10-CM

## 2025-06-30 LAB
ALBUMIN SERPL-MCNC: 4.3 G/DL (ref 3.5–5.2)
ALBUMIN/GLOB SERPL: 1.7 G/DL
ALP SERPL-CCNC: 61 U/L (ref 39–117)
ALT SERPL W P-5'-P-CCNC: 17 U/L (ref 1–33)
ANION GAP SERPL CALCULATED.3IONS-SCNC: 10.9 MMOL/L (ref 5–15)
AST SERPL-CCNC: 18 U/L (ref 1–32)
BILIRUB SERPL-MCNC: 0.3 MG/DL (ref 0–1.2)
BUN SERPL-MCNC: 14 MG/DL (ref 6–20)
BUN/CREAT SERPL: 18.7 (ref 7–25)
CALCIUM SPEC-SCNC: 8.9 MG/DL (ref 8.6–10.5)
CHLORIDE SERPL-SCNC: 102 MMOL/L (ref 98–107)
CO2 SERPL-SCNC: 23.1 MMOL/L (ref 22–29)
CREAT SERPL-MCNC: 0.75 MG/DL (ref 0.57–1)
EGFRCR SERPLBLD CKD-EPI 2021: 100.2 ML/MIN/1.73
GLOBULIN UR ELPH-MCNC: 2.6 GM/DL
GLUCOSE SERPL-MCNC: 82 MG/DL (ref 65–99)
POTASSIUM SERPL-SCNC: 4.1 MMOL/L (ref 3.5–5.2)
PROT SERPL-MCNC: 6.9 G/DL (ref 6–8.5)
SODIUM SERPL-SCNC: 136 MMOL/L (ref 136–145)

## 2025-06-30 PROCEDURE — 36415 COLL VENOUS BLD VENIPUNCTURE: CPT

## 2025-06-30 PROCEDURE — 80053 COMPREHEN METABOLIC PANEL: CPT

## (undated) DEVICE — SINGLE-USE BIOPSY FORCEPS: Brand: RADIAL JAW 4

## (undated) DEVICE — CANN NASL CO2 TRULINK W/O2 A/

## (undated) DEVICE — BITEBLOCK OMNI BLOC

## (undated) DEVICE — MSK ENDO PORT O2 POM ELITE CURAPLEX A/

## (undated) DEVICE — GOWN ,SIRUS,NONREINFORCED 3XL: Brand: MEDLINE

## (undated) DEVICE — TRAP SPECI SUCTIONPOLYPTRAP 4/CHMBR

## (undated) DEVICE — LAB CORP CLOTEST UREASE

## (undated) DEVICE — FLEX ADVANTAGE 1500CC: Brand: FLEX ADVANTAGE

## (undated) DEVICE — KT ORCA ORCAPOD DISP STRL

## (undated) DEVICE — Device

## (undated) DEVICE — GOWN ISOL W/THUMB UNIV BLU BX/15

## (undated) DEVICE — VIAL FORMLN CAP 10PCT 20ML